# Patient Record
Sex: FEMALE | Race: WHITE | NOT HISPANIC OR LATINO | Employment: OTHER | URBAN - METROPOLITAN AREA
[De-identification: names, ages, dates, MRNs, and addresses within clinical notes are randomized per-mention and may not be internally consistent; named-entity substitution may affect disease eponyms.]

---

## 2017-01-02 ENCOUNTER — APPOINTMENT (INPATIENT)
Dept: RADIOLOGY | Facility: HOSPITAL | Age: 82
DRG: 064 | End: 2017-01-02
Payer: MEDICARE

## 2017-01-02 PROCEDURE — 71010 HB CHEST X-RAY 1 VIEW FRONTAL (PORTABLE): CPT

## 2017-01-08 ENCOUNTER — APPOINTMENT (INPATIENT)
Dept: RADIOLOGY | Facility: HOSPITAL | Age: 82
DRG: 064 | End: 2017-01-08
Payer: MEDICARE

## 2017-01-08 PROCEDURE — 70450 CT HEAD/BRAIN W/O DYE: CPT

## 2017-01-11 PROBLEM — R13.10 DYSPHAGIA: Status: ACTIVE | Noted: 2017-01-11

## 2017-01-11 PROBLEM — I48.91 ATRIAL FIBRILLATION (HCC): Status: ACTIVE | Noted: 2017-01-11

## 2017-01-11 PROBLEM — E87.0 HYPERNATREMIA: Status: ACTIVE | Noted: 2017-01-11

## 2017-01-11 PROBLEM — F03.90 DEMENTIA (HCC): Status: ACTIVE | Noted: 2017-01-11

## 2017-01-11 PROBLEM — E86.0 DEHYDRATION: Status: ACTIVE | Noted: 2017-01-11

## 2017-01-12 PROBLEM — R31.9 HEMATURIA: Status: ACTIVE | Noted: 2017-01-12

## 2017-01-12 PROBLEM — E87.0 HYPERNATREMIA: Status: RESOLVED | Noted: 2017-01-11 | Resolved: 2017-01-12

## 2017-02-17 ENCOUNTER — APPOINTMENT (OUTPATIENT)
Dept: AUDIOLOGY | Facility: CLINIC | Age: 82
End: 2017-02-17
Payer: COMMERCIAL

## 2017-02-17 PROCEDURE — V5299 HEARING SERVICE: HCPCS | Performed by: AUDIOLOGIST

## 2017-03-15 ENCOUNTER — APPOINTMENT (OUTPATIENT)
Dept: AUDIOLOGY | Facility: CLINIC | Age: 82
End: 2017-03-15
Payer: COMMERCIAL

## 2017-05-05 ENCOUNTER — HOSPITAL ENCOUNTER (EMERGENCY)
Facility: HOSPITAL | Age: 82
Discharge: HOME | End: 2017-05-05
Attending: EMERGENCY MEDICINE | Admitting: EMERGENCY MEDICINE
Payer: MEDICARE

## 2017-05-05 ENCOUNTER — APPOINTMENT (EMERGENCY)
Dept: RADIOLOGY | Facility: HOSPITAL | Age: 82
End: 2017-05-05
Payer: MEDICARE

## 2017-05-05 VITALS
HEART RATE: 75 BPM | RESPIRATION RATE: 16 BRPM | SYSTOLIC BLOOD PRESSURE: 155 MMHG | OXYGEN SATURATION: 94 % | TEMPERATURE: 98 F | DIASTOLIC BLOOD PRESSURE: 76 MMHG | WEIGHT: 80 LBS | BODY MASS INDEX: 14.63 KG/M2

## 2017-05-05 DIAGNOSIS — M25.551 RIGHT HIP PAIN: Primary | ICD-10-CM

## 2017-05-05 PROCEDURE — 73502 X-RAY EXAM HIP UNI 2-3 VIEWS: CPT

## 2017-05-05 PROCEDURE — 99284 EMERGENCY DEPT VISIT MOD MDM: CPT

## 2017-05-05 RX ORDER — HYDROCODONE BITARTRATE AND ACETAMINOPHEN 5; 325 MG/1; MG/1
1 TABLET ORAL 2 TIMES DAILY
COMMUNITY
End: 2018-02-07 | Stop reason: SDUPTHER

## 2017-05-05 RX ORDER — IPRATROPIUM BROMIDE AND ALBUTEROL SULFATE 2.5; .5 MG/3ML; MG/3ML
3 SOLUTION RESPIRATORY (INHALATION) 2 TIMES DAILY
COMMUNITY
End: 2019-01-19 | Stop reason: ALTCHOICE

## 2017-05-05 RX ORDER — DOCUSATE SODIUM 100 MG/1
200 CAPSULE, LIQUID FILLED ORAL DAILY
COMMUNITY

## 2017-05-22 ENCOUNTER — HOSPITAL ENCOUNTER (EMERGENCY)
Facility: HOSPITAL | Age: 82
Discharge: HOME | End: 2017-05-22
Admitting: EMERGENCY MEDICINE
Payer: MEDICARE

## 2017-05-22 ENCOUNTER — APPOINTMENT (EMERGENCY)
Dept: RADIOLOGY | Facility: HOSPITAL | Age: 82
End: 2017-05-22
Payer: MEDICARE

## 2017-05-22 VITALS
TEMPERATURE: 97.5 F | SYSTOLIC BLOOD PRESSURE: 143 MMHG | WEIGHT: 80.03 LBS | BODY MASS INDEX: 14.73 KG/M2 | HEIGHT: 62 IN | OXYGEN SATURATION: 94 % | HEART RATE: 82 BPM | DIASTOLIC BLOOD PRESSURE: 77 MMHG | RESPIRATION RATE: 18 BRPM

## 2017-05-22 DIAGNOSIS — M25.551 RIGHT HIP PAIN: ICD-10-CM
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
## 2017-05-22 LAB
ALBUMIN SERPL BCP-MCNC: 3.6 G/DL (ref 3.5–5)
ALP SERPL-CCNC: 73 U/L (ref 46–116)
ALT SERPL W P-5'-P-CCNC: 23 U/L (ref 12–78)
ANION GAP SERPL CALCULATED.3IONS-SCNC: 7 MMOL/L (ref 4–13)
AST SERPL W P-5'-P-CCNC: 19 U/L (ref 5–45)
BASOPHILS # BLD AUTO: 0 THOUSANDS/ΜL (ref 0–0.1)
BASOPHILS NFR BLD AUTO: 0 % (ref 0–1)
BILIRUB SERPL-MCNC: 0.4 MG/DL (ref 0.2–1)
BUN SERPL-MCNC: 11 MG/DL (ref 5–25)
CALCIUM SERPL-MCNC: 9.4 MG/DL (ref 8.3–10.1)
CHLORIDE SERPL-SCNC: 100 MMOL/L (ref 100–108)
CO2 SERPL-SCNC: 33 MMOL/L (ref 21–32)
CREAT SERPL-MCNC: 0.75 MG/DL (ref 0.6–1.3)
EOSINOPHIL # BLD AUTO: 0.1 THOUSAND/ΜL (ref 0–0.61)
EOSINOPHIL NFR BLD AUTO: 1 % (ref 0–6)
ERYTHROCYTE [DISTWIDTH] IN BLOOD BY AUTOMATED COUNT: 12.9 % (ref 11.6–15.1)
GFR SERPL CREATININE-BSD FRML MDRD: >60 ML/MIN/1.73SQ M
GLUCOSE SERPL-MCNC: 104 MG/DL (ref 65–140)
HCT VFR BLD AUTO: 47.6 % (ref 37–47)
HGB BLD-MCNC: 15.6 G/DL (ref 12–16)
LYMPHOCYTES # BLD AUTO: 2.4 THOUSANDS/ΜL (ref 0.6–4.47)
LYMPHOCYTES NFR BLD AUTO: 27 % (ref 14–44)
MCH RBC QN AUTO: 31.3 PG (ref 27–31)
MCHC RBC AUTO-ENTMCNC: 32.8 G/DL (ref 31.4–37.4)
MCV RBC AUTO: 95 FL (ref 82–98)
MONOCYTES # BLD AUTO: 0.5 THOUSAND/ΜL (ref 0.17–1.22)
MONOCYTES NFR BLD AUTO: 6 % (ref 4–12)
NEUTROPHILS # BLD AUTO: 5.8 THOUSANDS/ΜL (ref 1.85–7.62)
NEUTS SEG NFR BLD AUTO: 66 % (ref 43–75)
NRBC BLD AUTO-RTO: 0 /100 WBCS
PLATELET # BLD AUTO: 277 THOUSANDS/UL (ref 130–400)
PMV BLD AUTO: 7.7 FL (ref 8.9–12.7)
POTASSIUM SERPL-SCNC: 4.1 MMOL/L (ref 3.5–5.3)
PROT SERPL-MCNC: 7.4 G/DL (ref 6.4–8.2)
RBC # BLD AUTO: 4.99 MILLION/UL (ref 4.2–5.4)
SODIUM SERPL-SCNC: 140 MMOL/L (ref 136–145)
WBC # BLD AUTO: 8.8 THOUSAND/UL (ref 4.8–10.8)

## 2017-05-22 PROCEDURE — 73502 X-RAY EXAM HIP UNI 2-3 VIEWS: CPT

## 2017-05-22 PROCEDURE — 36415 COLL VENOUS BLD VENIPUNCTURE: CPT | Performed by: PHYSICIAN ASSISTANT

## 2017-05-22 PROCEDURE — 70450 CT HEAD/BRAIN W/O DYE: CPT

## 2017-05-22 PROCEDURE — 85025 COMPLETE CBC W/AUTO DIFF WBC: CPT | Performed by: PHYSICIAN ASSISTANT

## 2017-05-22 PROCEDURE — 99284 EMERGENCY DEPT VISIT MOD MDM: CPT

## 2017-05-22 PROCEDURE — 80053 COMPREHEN METABOLIC PANEL: CPT | Performed by: PHYSICIAN ASSISTANT

## 2017-05-22 RX ORDER — ACETAMINOPHEN 325 MG/1
650 TABLET ORAL ONCE
Status: DISCONTINUED | OUTPATIENT
Start: 2017-05-22 | End: 2017-05-22

## 2018-01-11 NOTE — RESULT NOTES
Verified Results  * MAMMO SCREENING BILATERAL W CAD [id number] 11:56AM Girish Khan Order Number: [id number]    - Patient Instructions: To schedule this appointment, please contact Central Scheduling at [phone]  Do not wear any perfume, powder, lotion or deodorant on breast or underarm area  Please bring your doctors order, referral (if needed) and insurance information with you on the day of the test  Failure to bring this information may result in this test being rescheduled  Arrive 15 minutes prior to your appointment time to register  On the day of your test, please bring any prior mammogram or breast studies with you that were not performed at a [facility]  Failure to bring prior exams may result in your test needing to be rescheduled   Order Number: [id number]    - Patient Instructions: To schedule this appointment, please contact Central Scheduling at [phone]  Do not wear any perfume, powder, lotion or deodorant on breast or underarm area  Please bring your doctors order, referral (if needed) and insurance information with you on the day of the test  Failure to bring this information may result in this test being rescheduled  Arrive 15 minutes prior to your appointment time to register  On the day of your test, please bring any prior mammogram or breast studies with you that were not performed at a [facility]  Failure to bring prior exams may result in your test needing to be rescheduled   Order Number: [id number]    - Patient Instructions: To schedule this appointment, please contact Central Scheduling at [phone]  Do not wear any perfume, powder, lotion or deodorant on breast or underarm area  Please bring your doctors order, referral (if needed) and insurance information with you on the day of the test  Failure to bring this information may result in this test being rescheduled   Arrive 15 minutes prior to your appointment time to register  On the day of your test, please bring any prior mammogram or breast studies with you that were not performed at a [facility]  Failure to bring prior exams may result in your test needing to be rescheduled  Test Name Result Flag Reference   MAMMO SCREENING BILATERAL W CAD (Report)     Patient History:   Patient is postmenopausal    Patient's BMI is 17 8  Reason for exam: screening (asymptomatic)  Screening     Mammo Screening Bilateral W CAD: November 23, 2016 - Check In #:    [de-identified]   Bilateral MLO, CC, and XCCL view(s) were taken  Technologist: SONDRA Luz   Prior study comparison: September 28, 2015, [facility] screening    bilateral digital phoebe, performed at [place] [address]  July 29, 2014, bilateral screening mammogram, performed    at [place] [address]  July 15, 2013, bilateral   screening mammogram, performed at [place] [address]  May 3, 2012, bilateral screening mammogram, performed at   [place] [address]  April 28, 2011, bilateral    screening mammogram, performed at [place] [address]  April 19, 2010, bilateral screening mammogram, performed   at [place] [address]  February 12, 2009,    screening mammogram, performed at [place] [address]  The breast tissue is extremely dense, potentially limiting the    sensitivity of mammography  Patient risk, included in this    report, assists in determining the appropriate screening regimen    (such as 3-D mammography or the inclusion of automated breast    ultrasound or MRI)  3-D mammography may also remain indicated as    screening  Bilateral digital mammography is performed  No    dominant soft tissue mass, architectural distortion or suspicious   calcifications are noted  The skin and nipple contours are    within normal limits   Scattered benign appearing calcifications    are noted  No evidence of malignancy  No significant changes when   compared to prior studies  1  No evidence of malignancy  2  No significant change when compared with the prior study  ASSESSMENT: BiRad:2 - Benign     Recommendation:   Routine screening mammogram of both breasts in 1 year  A reminder letter will be scheduled   Analyzed by CAD     8-10% of cancers will be missed on mammography  Management of a    palpable abnormality must be based on clinical grounds  Patients   will be notified of their results via letter from our facility  Accredited by [employer] of [employer] and FDA       Transcription Location: [facility] 98: [id number]     Risk Value(s):   Tyrer-Cuzick 10 Year: 0 574%, Tyrer-Cuzick Lifetime: 0 574%,    Myriad Table: 1 5%, CHANO 5 Year: 1 3%, NCI Lifetime: 1 7%   Signed by:   Verna Hutchinson MD   11/23/16       Discussion/Summary   Your mammogram is normal    Coit Raymond

## 2018-01-16 NOTE — RESULT NOTES
Verified Results  (1) COMPREHENSIVE METABOLIC PANEL [id number] 48:51ZI Jonas Duarte   National Kidney Disease Education Program recommendations are as follows:  GFR calculation is accurate only with a steady state creatinine  Chronic Kidney disease less than 60 ml/min/1 73 sq  meters  Kidney failure less than 15 ml/min/1 73 sq  meters  Test Name Result Flag Reference   GLUCOSE,RANDM 102 mg/dL     SODIUM 140 mmol/L  136-145   POTASSIUM 4 4 mmol/L  3 5-5 3   CHLORIDE 103 mmol/L  100-108   CARBON DIOXIDE 29 mmol/L  21-32   ANION GAP (CALC) 8 mmol/L  4-13   BLOOD UREA NITROGEN 24 mg/dL  5-25   CREATININE 0 76 mg/dL  0 60-1 30   Standardized to IDMS reference method   CALCIUM 9 3 mg/dL  8 3-10 1   BILI, TOTAL 0 51 mg/dL  0 20-1 00   ALK PHOSPHATAS 78 U/L     ALT (SGPT) 49 U/L  12-78   AST(SGOT) 36 U/L  5-45   ALBUMIN 4 0 g/dL  3 5-5 0   TOTAL PROTEIN 7 5 g/dL  6 4-8 2   eGFR Non-African American      >60 0 ml/min/1 73sq m     (1) LIPID PANEL, FASTING [id number] 12:20PM Jonas Duarte   Triglyceride:         Normal              <150 mg/dl       Borderline High    150-199 mg/dl       High               200-499 mg/dl       Very High          >499 mg/dl  Cholesterol:         Desirable        <200 mg/dl      Borderline High  200-239 mg/dl      High             >239 mg/dl  HDL Cholesterol:        High    >59 mg/dL      Low     <41 mg/dL  LDL CALCULATED:    This screening LDL is a calculated result  It does not have the accuracy of the Direct Measured LDL in the monitoring of patients with hyperlipidemia and/or statin therapy  Direct Measure LDL (XZK467) must be ordered separately in these patients       Test Name Result Flag Reference   CHOLESTEROL 166 mg/dL     HDL,DIRECT 79 mg/dL H 40-60   LDL CHOLESTEROL CALCULATED 70 mg/dL  0-100   TRIGLYCERIDES 84 mg/dL  <=150       Discussion/Summary   all labs are normal - Dr LAKHANI

## 2018-01-17 NOTE — PROGRESS NOTES
Assessment    1  Diarrhea (787 91) (R19 7)   2  Primary localized osteoarthrosis of the hip, unspecified laterality (715 15) (M16 10)   3  Vulvovaginitis candida albicans (112 1) (B37 3)   4  Chronic obstructive pulmonary disease (496) (J44 9)    Plan  Chronic obstructive pulmonary disease    · Anoro Ellipta 62 5-25 MCG/INH Inhalation Aerosol Powder Breath Activated;  INHALE 1 PUFF DAILY  Diarrhea    · Gabapentin 100 MG Oral Capsule; TAKE 1 CAPSULE 3 TIMES DAILY  Primary localized osteoarthrosis of the hip, unspecified laterality    · Hydrocodone-Acetaminophen  MG Oral Tablet; TAKE 1 TABLET EVERY 6  HOURS AS NEEDED FOR PAIN  Vulvovaginitis candida albicans    · Fluconazole 150 MG Oral Tablet; 1 tab po once a week x 8  Unlinked    · Spiriva HandiHaler 18 MCG Inhalation Capsule    Discussion/Summary    Rto in 1 m   Boost or Ensure 2 times a day  Possible side effects of new medications were reviewed with the patient/guardian today  The treatment plan was reviewed with the patient/guardian  The patient/guardian understands and agrees with the treatment plan      Chief Complaint  Pt c/o diarrhea, weakness, and tiredness, increase in yeast inf; referral to see Rheumatologist randa fowler      History of Present Illness  HPI: 80 y o f with multiple issues seen with her daughter - 1  frequent vaginal irritation/yeast infections over last few months, oral med helped in the past, 2 episode of constipation and diarrhea - on pain meds, taking laxatives PRN but has diarrhea for a few days after, eating less but still has a good appetite, 3 multiple joints pain - Vicodin helps only some  4 has COPD - stopped smoking many years ago - SOB with minimal exertion, on Spiriva with minimal help, not on home O2      Review of Systems    Constitutional: recent weight loss, but no fever, not feeling poorly and no recent weight gain     Cardiovascular: no complaints of slow or fast heart rate, no chest pain, no palpitations, no leg claudication or lower extremity edema  Respiratory: shortness of breath during exertion, but as noted in HPI, no shortness of breath, no cough, no orthopnea and no wheezing  Gastrointestinal: constipation and diarrhea, but as noted in HPI, no abdominal pain and no nausea  Musculoskeletal: arthralgias and myalgias, but as noted in HPI, no joint swelling and no joint stiffness  Integumentary: no complaints of skin rash or lesion, no itching or dry skin, no skin wounds  Neurological: no complaints of headache, no confusion, no numbness or tingling, no dizziness or fainting  Active Problems    1  History of Aftercare following joint replacement surgery (V54 81) (Z47 1)   2  Arthritis (716 90) (M19 90)   3  Chronic obstructive pulmonary disease (496) (J44 9)   4  Depression (311) (F32 9)   5  Encounter for screening mammogram for malignant neoplasm of breast (V76 12)   (Z12 31)   6  Hypercholesterolemia (272 0) (E78 0)   7  Hypertension (401 9) (I10)   8  Overactive bladder (596 51) (N32 81)   9  Primary localized osteoarthrosis of the hip, unspecified laterality (715 15) (M16 10)    Past Medical History    1  History of Aftercare following joint replacement surgery (V54 81) (Z47 1)   2  History of Epigastric pain (789 06) (R10 13)   3  History of insect bite (V15 59) (Z87 828)   4  History of Stroke Syndrome (436)    Family History    1  Family history of Coronary arteriosclerosis    2  Family history of Arthritis (V17 7)   3  Family history of Cancer  Family History Reviewed: The family history was reviewed and updated today  Social History    · Daily Coffee Consumption (2  Cups/Day)   · Never A Smoker  The social history was reviewed and updated today  Surgical History    1  History of Cholecystectomy   2  History of Elbow Surgery   3  History of Hemorrhoidectomy   4  History of Hip Surgery   5  History of Hysterectomy  Surgical History Reviewed:    The surgical history was reviewed and updated today  Current Meds   1  Allopurinol 100 MG Oral Tablet; Therapy: (Recorded:45Isi3922) to Recorded   2  Aricept 10 MG Oral Tablet; Therapy: (Recorded:16Vkl1862) to Recorded   3  Aspir-81 TBEC; Therapy: (Recorded:26Jkd8222) to Recorded   4  Atenolol 100 MG Oral Tablet; 1 every day; Therapy: (Pauly Harding) to  Requested for: 31Dgz1156 Recorded   5  Crestor 10 MG Oral Tablet; 1 EVERY MORNING  Requested for: 80RVJ3348; Last   Rx:49Hyq7778 Ordered   6  Hydrocodone-Acetaminophen  MG Oral Tablet; TAKE 1 TABLET EVERY 6   HOURS AS NEEDED FOR PAIN; Last Rx:17Uxm8190 Ordered   7  Mupirocin Calcium 2 % External Cream; APPLY THIN LAYER TO AFFECTED AREA(S) 3   TIMES DAILY; Therapy: 72Yth5632 to (Last Rx:76Hmx6609)  Requested for: 63Bkb8578 Ordered   8  PARoxetine HCl - 20 MG Oral Tablet; take 1 tablet by mouth once daily; Therapy: 33CEZ3868 to ([id number])  Requested for: 35DIA8593; Last   Rx:45Ntv0931 Ordered   9  Spiriva HandiHaler 18 MCG Inhalation Capsule; Therapy: (Recorded:60Uen9894) to Recorded   10  Voltaren 1 % Transdermal Gel; Therapy: (Recorded:30Jto4434) to Recorded    Allergies    1  No Known Drug Allergies    Vitals   Recorded: 95CWO4763 11:34AM   Temperature 97 4 F   Heart Rate 74   Respiration 16   Systolic 256   Diastolic 70   Height 5 ft    Weight 94 lb    BMI Calculated 18 36   BSA Calculated 1 35     Physical Exam    Constitutional   General appearance: Abnormal   chronically ill, comfortable, underweight and appears older than stated age  Pulmonary   Respiratory effort: Abnormal   Assessment of respiratory effort revealed accessory muscle use, but no air hunger and no distress  Respiratory Findings: no dry cough and no wet cough  Auscultation of lungs: Abnormal   Auscultation of the lungs revealed decreased breath sounds diffusely  no rales or crackles were heard bilaterally  no rhonchi  no wheezing     Cardiovascular   Auscultation of heart: Normal rate and rhythm, normal S1 and S2, without murmurs  Examination of extremities for edema and/or varicosities: Normal     Abdomen   Abdomen: Non-tender, no masses  Musculoskeletal   Gait and station: Normal     Inspection/palpation of joints, bones, and muscles: Normal          Future Appointments    Date/Time Provider Specialty Site   02/22/2016 02:00 PM May GONSALO Hall   Family Medicine [address]     Signatures   Electronically signed by : GONSALO Daivs ; Jan 14 2016  6:38PM EST                       (Author)

## 2018-02-07 DIAGNOSIS — R52 PAIN: Primary | ICD-10-CM

## 2018-02-07 RX ORDER — HYDROCODONE BITARTRATE AND ACETAMINOPHEN 5; 325 MG/1; MG/1
TABLET ORAL
Qty: 60 TABLET | Refills: 0 | Status: SHIPPED | OUTPATIENT
Start: 2018-02-07 | End: 2018-03-08 | Stop reason: SDUPTHER

## 2018-03-08 DIAGNOSIS — R52 PAIN: ICD-10-CM

## 2018-03-09 DIAGNOSIS — R52 PAIN: ICD-10-CM

## 2018-03-09 RX ORDER — HYDROCODONE BITARTRATE AND ACETAMINOPHEN 5; 325 MG/1; MG/1
1 TABLET ORAL 2 TIMES DAILY
Qty: 60 TABLET | Refills: 0 | Status: SHIPPED | OUTPATIENT
Start: 2018-03-09 | End: 2018-04-09 | Stop reason: SDUPTHER

## 2018-03-09 RX ORDER — HYDROCODONE BITARTRATE AND ACETAMINOPHEN 5; 325 MG/1; MG/1
TABLET ORAL
Qty: 60 TABLET | Refills: 0 | Status: SHIPPED | OUTPATIENT
Start: 2018-03-09 | End: 2018-03-09 | Stop reason: SDUPTHER

## 2018-04-09 DIAGNOSIS — R52 PAIN: ICD-10-CM

## 2018-04-09 RX ORDER — HYDROCODONE BITARTRATE AND ACETAMINOPHEN 5; 325 MG/1; MG/1
TABLET ORAL
Qty: 60 TABLET | Refills: 0 | Status: SHIPPED | OUTPATIENT
Start: 2018-04-09 | End: 2018-04-09 | Stop reason: SDUPTHER

## 2018-04-09 RX ORDER — HYDROCODONE BITARTRATE AND ACETAMINOPHEN 5; 325 MG/1; MG/1
TABLET ORAL
Qty: 60 TABLET | Refills: 0 | Status: SHIPPED | OUTPATIENT
Start: 2018-04-09 | End: 2018-05-01 | Stop reason: SDUPTHER

## 2018-04-10 DIAGNOSIS — R52 PAIN: ICD-10-CM

## 2018-04-10 RX ORDER — HYDROCODONE BITARTRATE AND ACETAMINOPHEN 5; 325 MG/1; MG/1
TABLET ORAL
Qty: 60 TABLET | Refills: 0 | OUTPATIENT
Start: 2018-04-10

## 2018-05-01 DIAGNOSIS — R52 PAIN: ICD-10-CM

## 2018-05-01 RX ORDER — HYDROCODONE BITARTRATE AND ACETAMINOPHEN 5; 325 MG/1; MG/1
TABLET ORAL
Qty: 60 TABLET | Refills: 0 | Status: SHIPPED | OUTPATIENT
Start: 2018-05-01 | End: 2018-05-02 | Stop reason: SDUPTHER

## 2018-05-02 DIAGNOSIS — R52 PAIN: ICD-10-CM

## 2018-05-02 RX ORDER — HYDROCODONE BITARTRATE AND ACETAMINOPHEN 5; 325 MG/1; MG/1
TABLET ORAL
Qty: 60 TABLET | Refills: 0 | Status: SHIPPED | OUTPATIENT
Start: 2018-05-02 | End: 2018-06-08 | Stop reason: SDUPTHER

## 2018-05-09 ENCOUNTER — TELEPHONE (OUTPATIENT)
Dept: FAMILY MEDICINE CLINIC | Facility: CLINIC | Age: 83
End: 2018-05-09

## 2018-05-09 NOTE — TELEPHONE ENCOUNTER
Dr Adelaida Austin    Patient's daughter wants to know if you were able to check on sore by patient's nose last time you were at [facility]  Says she discussed this with you recently

## 2018-06-08 DIAGNOSIS — R52 PAIN: ICD-10-CM

## 2018-06-08 RX ORDER — HYDROCODONE BITARTRATE AND ACETAMINOPHEN 5; 325 MG/1; MG/1
TABLET ORAL
Qty: 60 TABLET | Refills: 0 | Status: SHIPPED | OUTPATIENT
Start: 2018-06-08 | End: 2018-07-09 | Stop reason: SDUPTHER

## 2018-07-09 DIAGNOSIS — R52 PAIN: ICD-10-CM

## 2018-07-09 RX ORDER — HYDROCODONE BITARTRATE AND ACETAMINOPHEN 5; 325 MG/1; MG/1
TABLET ORAL
Qty: 60 TABLET | Refills: 0 | Status: SHIPPED | OUTPATIENT
Start: 2018-07-09 | End: 2018-08-10 | Stop reason: SDUPTHER

## 2018-08-10 DIAGNOSIS — R52 PAIN: ICD-10-CM

## 2018-08-10 RX ORDER — HYDROCODONE BITARTRATE AND ACETAMINOPHEN 5; 325 MG/1; MG/1
TABLET ORAL
Qty: 60 TABLET | Refills: 0 | Status: SHIPPED | OUTPATIENT
Start: 2018-08-10 | End: 2018-08-28 | Stop reason: SDUPTHER

## 2018-08-28 DIAGNOSIS — R52 PAIN: ICD-10-CM

## 2018-08-28 RX ORDER — HYDROCODONE BITARTRATE AND ACETAMINOPHEN 5; 325 MG/1; MG/1
TABLET ORAL
Qty: 60 TABLET | Refills: 0 | Status: SHIPPED | OUTPATIENT
Start: 2018-08-28 | End: 2018-10-08 | Stop reason: SDUPTHER

## 2018-10-08 DIAGNOSIS — R52 PAIN: ICD-10-CM

## 2018-10-08 RX ORDER — HYDROCODONE BITARTRATE AND ACETAMINOPHEN 5; 325 MG/1; MG/1
TABLET ORAL
Qty: 60 TABLET | Refills: 0 | Status: SHIPPED | OUTPATIENT
Start: 2018-10-08 | End: 2018-11-07 | Stop reason: SDUPTHER

## 2018-11-07 DIAGNOSIS — R52 PAIN: ICD-10-CM

## 2018-11-07 RX ORDER — HYDROCODONE BITARTRATE AND ACETAMINOPHEN 5; 325 MG/1; MG/1
TABLET ORAL
Qty: 60 TABLET | Refills: 0 | Status: SHIPPED | OUTPATIENT
Start: 2018-11-07 | End: 2018-12-11 | Stop reason: SDUPTHER

## 2018-12-11 DIAGNOSIS — R52 PAIN: ICD-10-CM

## 2018-12-11 RX ORDER — HYDROCODONE BITARTRATE AND ACETAMINOPHEN 5; 325 MG/1; MG/1
TABLET ORAL
Qty: 60 TABLET | Refills: 0 | Status: SHIPPED | OUTPATIENT
Start: 2018-12-11 | End: 2019-01-19 | Stop reason: SDUPTHER

## 2018-12-11 RX ORDER — HYDROCODONE BITARTRATE AND ACETAMINOPHEN 5; 325 MG/1; MG/1
TABLET ORAL
Qty: 60 TABLET | Refills: 0 | Status: SHIPPED | OUTPATIENT
Start: 2018-12-11 | End: 2019-01-08 | Stop reason: SDUPTHER

## 2019-01-08 DIAGNOSIS — R52 PAIN: ICD-10-CM

## 2019-01-08 RX ORDER — HYDROCODONE BITARTRATE AND ACETAMINOPHEN 5; 325 MG/1; MG/1
TABLET ORAL
Qty: 60 TABLET | Refills: 0 | Status: SHIPPED | OUTPATIENT
Start: 2019-01-08 | End: 2019-01-23 | Stop reason: SDUPTHER

## 2019-01-13 ENCOUNTER — APPOINTMENT (EMERGENCY)
Dept: RADIOLOGY | Facility: HOSPITAL | Age: 84
End: 2019-01-13
Payer: MEDICARE

## 2019-01-13 ENCOUNTER — APPOINTMENT (EMERGENCY)
Dept: RADIOLOGY | Facility: HOSPITAL | Age: 84
End: 2019-01-13
Attending: EMERGENCY MEDICINE
Payer: MEDICARE

## 2019-01-13 ENCOUNTER — HOSPITAL ENCOUNTER (EMERGENCY)
Facility: HOSPITAL | Age: 84
Discharge: NURSING, REHAB OR LONG-TERM CARE | End: 2019-01-13
Attending: EMERGENCY MEDICINE | Admitting: EMERGENCY MEDICINE
Payer: MEDICARE

## 2019-01-13 VITALS
TEMPERATURE: 96.1 F | RESPIRATION RATE: 20 BRPM | DIASTOLIC BLOOD PRESSURE: 92 MMHG | SYSTOLIC BLOOD PRESSURE: 163 MMHG | WEIGHT: 100 LBS | BODY MASS INDEX: 18.29 KG/M2 | HEART RATE: 84 BPM | OXYGEN SATURATION: 98 %

## 2019-01-13 DIAGNOSIS — N39.0 UTI (URINARY TRACT INFECTION): Primary | ICD-10-CM

## 2019-01-13 LAB
ALBUMIN SERPL BCP-MCNC: 3.2 G/DL (ref 3.5–5)
ALP SERPL-CCNC: 77 U/L (ref 46–116)
ALT SERPL W P-5'-P-CCNC: 20 U/L (ref 12–78)
ANION GAP SERPL CALCULATED.3IONS-SCNC: 9 MMOL/L (ref 4–13)
AST SERPL W P-5'-P-CCNC: 21 U/L (ref 5–45)
BACTERIA UR QL AUTO: ABNORMAL /HPF
BASOPHILS # BLD AUTO: 0.08 THOUSANDS/ΜL (ref 0–0.1)
BASOPHILS NFR BLD AUTO: 1 % (ref 0–1)
BILIRUB SERPL-MCNC: 0.3 MG/DL (ref 0.2–1)
BILIRUB UR QL STRIP: NEGATIVE
BUN SERPL-MCNC: 19 MG/DL (ref 5–25)
CALCIUM SERPL-MCNC: 9.1 MG/DL (ref 8.3–10.1)
CHLORIDE SERPL-SCNC: 102 MMOL/L (ref 100–108)
CK SERPL-CCNC: 45 U/L (ref 26–192)
CLARITY UR: ABNORMAL
CO2 SERPL-SCNC: 30 MMOL/L (ref 21–32)
COLOR UR: YELLOW
CREAT SERPL-MCNC: 0.84 MG/DL (ref 0.6–1.3)
EOSINOPHIL # BLD AUTO: 0.16 THOUSAND/ΜL (ref 0–0.61)
EOSINOPHIL NFR BLD AUTO: 1 % (ref 0–6)
ERYTHROCYTE [DISTWIDTH] IN BLOOD BY AUTOMATED COUNT: 11.6 % (ref 11.6–15.1)
GFR SERPL CREATININE-BSD FRML MDRD: 64 ML/MIN/1.73SQ M
GLUCOSE SERPL-MCNC: 141 MG/DL (ref 65–140)
GLUCOSE UR STRIP-MCNC: NEGATIVE MG/DL
HCT VFR BLD AUTO: 45.2 % (ref 34.8–46.1)
HGB BLD-MCNC: 14.2 G/DL (ref 11.5–15.4)
HGB UR QL STRIP.AUTO: ABNORMAL
IMM GRANULOCYTES # BLD AUTO: 0.04 THOUSAND/UL (ref 0–0.2)
IMM GRANULOCYTES NFR BLD AUTO: 0 % (ref 0–2)
KETONES UR STRIP-MCNC: ABNORMAL MG/DL
LEUKOCYTE ESTERASE UR QL STRIP: ABNORMAL
LYMPHOCYTES # BLD AUTO: 1.93 THOUSANDS/ΜL (ref 0.6–4.47)
LYMPHOCYTES NFR BLD AUTO: 15 % (ref 14–44)
MCH RBC QN AUTO: 31.2 PG (ref 26.8–34.3)
MCHC RBC AUTO-ENTMCNC: 31.4 G/DL (ref 31.4–37.4)
MCV RBC AUTO: 99 FL (ref 82–98)
MONOCYTES # BLD AUTO: 0.81 THOUSAND/ΜL (ref 0.17–1.22)
MONOCYTES NFR BLD AUTO: 6 % (ref 4–12)
NEUTROPHILS # BLD AUTO: 10.11 THOUSANDS/ΜL (ref 1.85–7.62)
NEUTS SEG NFR BLD AUTO: 77 % (ref 43–75)
NITRITE UR QL STRIP: NEGATIVE
NON-SQ EPI CELLS URNS QL MICRO: ABNORMAL /HPF
NRBC BLD AUTO-RTO: 0 /100 WBCS
NT-PROBNP SERPL-MCNC: 650 PG/ML
PH UR STRIP.AUTO: 6 [PH] (ref 5–9)
PLATELET # BLD AUTO: 268 THOUSANDS/UL (ref 149–390)
PMV BLD AUTO: 9.6 FL (ref 8.9–12.7)
POTASSIUM SERPL-SCNC: 3.5 MMOL/L (ref 3.5–5.3)
PROT SERPL-MCNC: 7.1 G/DL (ref 6.4–8.2)
PROT UR STRIP-MCNC: ABNORMAL MG/DL
RBC # BLD AUTO: 4.55 MILLION/UL (ref 3.81–5.12)
RBC #/AREA URNS AUTO: ABNORMAL /HPF
SODIUM SERPL-SCNC: 141 MMOL/L (ref 136–145)
SP GR UR STRIP.AUTO: 1.02 (ref 1–1.03)
TROPONIN I SERPL-MCNC: <0.02 NG/ML
UROBILINOGEN UR QL STRIP.AUTO: 1 E.U./DL
WBC # BLD AUTO: 13.13 THOUSAND/UL (ref 4.31–10.16)
WBC #/AREA URNS AUTO: ABNORMAL /HPF

## 2019-01-13 PROCEDURE — 84484 ASSAY OF TROPONIN QUANT: CPT | Performed by: EMERGENCY MEDICINE

## 2019-01-13 PROCEDURE — 80053 COMPREHEN METABOLIC PANEL: CPT | Performed by: EMERGENCY MEDICINE

## 2019-01-13 PROCEDURE — 96365 THER/PROPH/DIAG IV INF INIT: CPT

## 2019-01-13 PROCEDURE — 83880 ASSAY OF NATRIURETIC PEPTIDE: CPT | Performed by: EMERGENCY MEDICINE

## 2019-01-13 PROCEDURE — 73502 X-RAY EXAM HIP UNI 2-3 VIEWS: CPT

## 2019-01-13 PROCEDURE — 85025 COMPLETE CBC W/AUTO DIFF WBC: CPT | Performed by: EMERGENCY MEDICINE

## 2019-01-13 PROCEDURE — 99285 EMERGENCY DEPT VISIT HI MDM: CPT

## 2019-01-13 PROCEDURE — 82550 ASSAY OF CK (CPK): CPT | Performed by: EMERGENCY MEDICINE

## 2019-01-13 PROCEDURE — 81001 URINALYSIS AUTO W/SCOPE: CPT | Performed by: EMERGENCY MEDICINE

## 2019-01-13 PROCEDURE — 36415 COLL VENOUS BLD VENIPUNCTURE: CPT | Performed by: EMERGENCY MEDICINE

## 2019-01-13 PROCEDURE — 71045 X-RAY EXAM CHEST 1 VIEW: CPT

## 2019-01-13 PROCEDURE — 72125 CT NECK SPINE W/O DYE: CPT

## 2019-01-13 PROCEDURE — 70450 CT HEAD/BRAIN W/O DYE: CPT

## 2019-01-13 RX ORDER — CEPHALEXIN 250 MG/1
500 CAPSULE ORAL EVERY 12 HOURS SCHEDULED
Qty: 20 CAPSULE | Refills: 0 | Status: SHIPPED | OUTPATIENT
Start: 2019-01-13 | End: 2019-01-13 | Stop reason: ALTCHOICE

## 2019-01-13 RX ORDER — CEFTRIAXONE 1 G/50ML
1000 INJECTION, SOLUTION INTRAVENOUS ONCE
Status: COMPLETED | OUTPATIENT
Start: 2019-01-13 | End: 2019-01-13

## 2019-01-13 RX ORDER — NITROFURANTOIN 25; 75 MG/1; MG/1
100 CAPSULE ORAL 2 TIMES DAILY
Qty: 10 CAPSULE | Refills: 0 | Status: SHIPPED | OUTPATIENT
Start: 2019-01-13 | End: 2019-01-22 | Stop reason: HOSPADM

## 2019-01-13 RX ADMIN — CEFTRIAXONE 1000 MG: 1 INJECTION, SOLUTION INTRAVENOUS at 02:43

## 2019-01-13 NOTE — DISCHARGE INSTRUCTIONS

## 2019-01-13 NOTE — ED NOTES
Patient advised will be leaving in the morning to return to [facility], and repositioned on [address], RN  01/13/19 8334

## 2019-01-13 NOTE — ED PROVIDER NOTES
History  Chief Complaint   Patient presents with    Fall     patient found on ground at shift change, patient was agitated and confused has a history of dementia     Pt in ER from NH after an unwitnessed fall, she was found at her bedside at shift change  Pt has a hx of dementia and frequent falls  She is a poor historian  Prior to Admission Medications   Prescriptions Last Dose Informant Patient Reported? Taking?    HYDROcodone-acetaminophen (NORCO) 5-325 mg per tablet   No No   Si TABLET BY MOUTH TWICE DAILY   HYDROcodone-acetaminophen (NORCO) 5-325 mg per tablet   No No   Si tab PO 2 times a day for pain   acetaminophen (TYLENOL) 325 mg tablet   No No   Sig: Take 2 tablets by mouth every 4 (four) hours as needed for mild pain, headaches or fever   Patient taking differently: Take 325 mg by mouth daily At 1400    albuterol (PROVENTIL HFA,VENTOLIN HFA) 90 mcg/act inhaler   Yes No   Sig: Inhale 2 puffs every 6 (six) hours as needed for wheezing   artificial tear (LUBRIFRESH P M ) 83-15 % ophthalmic ointment   No No   Sig: Administer 1 application to both eyes daily at bedtime for 30 days   aspirin 81 MG tablet   Yes No   Sig: Take 81 mg by mouth daily   bisacodyl (DULCOLAX) 10 mg suppository   No No   Sig: Insert 1 suppository into the rectum daily as needed for constipation for up to 30 days   diltiazem (CARDIZEM SR) 60 mg 12 hr capsule   No No   Sig: Take 1 capsule by mouth every 12 (twelve) hours for 30 days   docusate sodium (COLACE) 100 mg capsule   Yes No   Sig: Take 200 mg by mouth daily   donepezil (ARICEPT) 10 mg tablet   Yes No   Sig: Take 10 mg by mouth daily at bedtime   ipratropium-albuterol (DUONEB) 0 5-2 5 mg/mL   Yes No   Sig: Take 3 mL by nebulization 2 (two) times a day   metoprolol tartrate 75 MG TABS   No No   Sig: Take 1 tablet by mouth every 12 (twelve) hours for 30 days   Patient taking differently: Take 75 mg by mouth 2 (two) times a day With meals    rosuvastatin (CRESTOR) 10 MG tablet   Yes No   Sig: Take 10 mg by mouth daily      Facility-Administered Medications: None       Past Medical History:   Diagnosis Date    Arthritis     [facility] [facility]     Dementia     [facility]     Gout     Gout     Hyperlipidemia     Hypertension        Past Surgical History:   Procedure Laterality Date    CATARACT EXTRACTION W/  INTRAOCULAR LENS IMPLANT Bilateral     CHOLECYSTECTOMY      ELBOW FRACTURE SURGERY      HYSTERECTOMY      JOINT REPLACEMENT Bilateral     Had bilateral hip surgery  One was repaired secondary to fracture other was replaced due to DJD       Family History   Problem Relation Age of Onset    Heart attack Father 45     I have reviewed and agree with the history as documented  Social History   Substance Use Topics    Smoking status: Former Smoker     Packs/day: 1 00     Years: 25 00     Types: Cigarettes     Quit date: 1976    Smokeless tobacco: Never Used      Comment: Patient former smoker, quit 1976    Alcohol use No        Review of Systems   Unable to perform ROS: Dementia       Physical Exam  Physical Exam   Constitutional: She appears well-developed and well-nourished  HENT:   Head: Normocephalic and atraumatic  Cardiovascular: Normal rate and regular rhythm  Pulmonary/Chest: Effort normal and breath sounds normal  No respiratory distress  Musculoskeletal: She exhibits tenderness  She exhibits no edema or deformity  Right hip: She exhibits tenderness and bony tenderness  She exhibits normal range of motion, normal strength, no swelling, no crepitus, no deformity and no laceration  Left hip: Normal         Right knee: Normal         Left knee: Normal         Right ankle: Normal         Left ankle: Normal    RUE flaccid   Neurological: She is alert  No cranial nerve deficit  Coordination normal    Nursing note and vitals reviewed        Vital Signs  ED Triage Vitals [01/13/19 0113]   Temp Pulse Respirations Blood Pressure SpO2   -- 69 18 148/82 94 %      Temp src Heart Rate Source Patient Position - Orthostatic VS BP Location FiO2 (%)   -- Monitor Lying Right arm --      Pain Score       --           Vitals:    01/13/19 0113   BP: 148/82   Pulse: 69   Patient Position - Orthostatic VS: Lying       Visual Acuity      ED Medications  Medications   cefTRIAXone (ROCEPHIN) IVPB (premix) 1,000 mg (1,000 mg Intravenous New Bag 1/13/19 0243)       Diagnostic Studies  Results Reviewed     Procedure Component Value Units Date/Time    Urine culture [[id number]]     Lab Status:  No result Specimen:  Urine     Urine Microscopic [[id number]]  (Abnormal) Collected:  01/13/19 0222    Lab Status:  Final result Specimen:  Urine from Urine, Straight Cath Updated:  01/13/19 0234     RBC, UA Innumerable (A) /hpf      WBC, UA Innumerable (A) /hpf      Epithelial Cells Occasional /hpf      Bacteria, UA Moderate (A) /hpf     UA w Reflex to Microscopic [[id number]]  (Abnormal) Collected:  01/13/19 0222    Lab Status:  Final result Specimen:  Urine from Urine, Straight Cath Updated:  01/13/19 0227     Color, UA Yellow     Clarity, UA Cloudy     Specific Gravity, UA 1 025     pH, UA 6 0     Leukocytes, UA Moderate (A)     Nitrite, UA Negative     Protein, UA Trace (A) mg/dl      Glucose, UA Negative mg/dl      Ketones, UA Trace (A) mg/dl      Urobilinogen, UA 1 0 E U /dl      Bilirubin, UA Negative     Blood, UA Large (A)    CK Total with Reflex CKMB [[id number]]  (Normal) Collected:  01/13/19 0124    Lab Status:  Final result Specimen:  Blood from Arm, Left Updated:  01/13/19 0151     Total CK 45 U/L     B-type natriuretic peptide [[id number]]  (Abnormal) Collected:  01/13/19 0124    Lab Status:  Final result Specimen:  Blood from Arm, Left Updated:  01/13/19 0151     NT-proBNP 650 (H) pg/mL     Troponin I [[id number]]  (Normal) Collected:  01/13/19 0124    Lab Status:  Final result Specimen:  Blood from Arm, Left Updated: 01/13/19 0146     Troponin I <0 02 ng/mL     Comprehensive metabolic panel [[id number]]  (Abnormal) Collected:  01/13/19 0124    Lab Status:  Final result Specimen:  Blood from Arm, Left Updated:  01/13/19 0145     Sodium 141 mmol/L      Potassium 3 5 mmol/L      Chloride 102 mmol/L      CO2 30 mmol/L      ANION GAP 9 mmol/L      BUN 19 mg/dL      Creatinine 0 84 mg/dL      Glucose 141 (H) mg/dL      Calcium 9 1 mg/dL      AST 21 U/L      ALT 20 U/L      Alkaline Phosphatase 77 U/L      Total Protein 7 1 g/dL      Albumin 3 2 (L) g/dL      Total Bilirubin 0 30 mg/dL      eGFR 64 ml/min/1 73sq m     Narrative:         National Kidney Disease Education Program recommendations are as follows:  GFR calculation is accurate only with a steady state creatinine  Chronic Kidney disease less than 60 ml/min/1 73 sq  meters  Kidney failure less than 15 ml/min/1 73 sq  meters  CBC and differential [[id number]]  (Abnormal) Collected:  01/13/19 0124    Lab Status:  Final result Specimen:  Blood from Arm, Left Updated:  01/13/19 0130     WBC 13 13 (H) Thousand/uL      RBC 4 55 Million/uL      Hemoglobin 14 2 g/dL      Hematocrit 45 2 %      MCV 99 (H) fL      MCH 31 2 pg      MCHC 31 4 g/dL      RDW 11 6 %      MPV 9 6 fL      Platelets 239 Thousands/uL      nRBC 0 /100 WBCs      Neutrophils Relative 77 (H) %      Immat GRANS % 0 %      Lymphocytes Relative 15 %      Monocytes Relative 6 %      Eosinophils Relative 1 %      Basophils Relative 1 %      Neutrophils Absolute 10 11 (H) Thousands/µL      Immature Grans Absolute 0 04 Thousand/uL      Lymphocytes Absolute 1 93 Thousands/µL      Monocytes Absolute 0 81 Thousand/µL      Eosinophils Absolute 0 16 Thousand/µL      Basophils Absolute 0 08 Thousands/µL                  XR hip/pelv 2-3 vws right   ED Interpretation by Elizabet Ram DO (01/13 0330)   nad      CT head without contrast   Final Result by Reji Gardner MD (01/13 0122)      1    No intracranial hemorrhage or calvarial fracture  2   Chronic small vessel ischemic changes  3   Redemonstrated chronic left middle cerebral artery territory infarction  Workstation performed: [id number]         CT cervical spine without contrast   Final Result by Jose Gomez MD (01/13 6168)      No cervical spine fracture or traumatic malalignment  Workstation performed: [id number]         XR chest 1 view portable    (Results Pending)              Procedures  Procedures       Phone Contacts  ED Phone Contact    ED Course                               MDM  Number of Diagnoses or Management Options  UTI (urinary tract infection):   Diagnosis management comments: Pt with a UTI, started on Rocephin  Previous cultures reviewed and resistant to Cephazolin  Will d/c to NH with macrobid  Amount and/or Complexity of Data Reviewed  Clinical lab tests: ordered and reviewed  Tests in the radiology section of CPT®: ordered and reviewed    Risk of Complications, Morbidity, and/or Mortality  Presenting problems: moderate  Diagnostic procedures: moderate  Management options: moderate    Patient Progress  Patient progress: stable    CritCare Time    Disposition  Final diagnoses:   UTI (urinary tract infection)     Time reflects when diagnosis was documented in both MDM as applicable and the Disposition within this note     Time User Action Codes Description Comment    1/13/2019  2:48 AM Richard Friday Add [N39 0] UTI (urinary tract infection)       ED Disposition     ED Disposition Condition Comment    Discharge  Geremias Ates discharge to home/self care      Condition at discharge: Stable        Follow-up Information     Follow up With Specialties Details Why Contact Amado Boateng MD Family Medicine Schedule an appointment as soon as possible for a visit in 1 day for follow up [id number] [facility] [id number] [phone]            Patient's Medications   Discharge Prescriptions NITROFURANTOIN (MACROBID) 100 MG CAPSULE    Take 1 capsule (100 mg total) by mouth 2 (two) times a day       Start Date: 1/13/2019 End Date: --       Order Dose: 100 mg       Quantity: 10 capsule    Refills: 0     No discharge procedures on file      ED Provider  Electronically Signed by           Elizabet Ram DO  01/13/19 1480

## 2019-01-13 NOTE — ED NOTES
Report given to [facility], [address] [facility]  01/13/19 1245

## 2019-01-19 ENCOUNTER — APPOINTMENT (EMERGENCY)
Dept: RADIOLOGY | Facility: HOSPITAL | Age: 84
DRG: 189 | End: 2019-01-19
Payer: MEDICARE

## 2019-01-19 ENCOUNTER — HOSPITAL ENCOUNTER (INPATIENT)
Facility: HOSPITAL | Age: 84
LOS: 3 days | Discharge: NURSING, REHAB OR LONG-TERM CARE | DRG: 189 | End: 2019-01-22
Attending: EMERGENCY MEDICINE | Admitting: INTERNAL MEDICINE
Payer: MEDICARE

## 2019-01-19 ENCOUNTER — APPOINTMENT (INPATIENT)
Dept: RADIOLOGY | Facility: HOSPITAL | Age: 84
DRG: 189 | End: 2019-01-19
Payer: MEDICARE

## 2019-01-19 DIAGNOSIS — J44.9 COPD (CHRONIC OBSTRUCTIVE PULMONARY DISEASE) (HCC): ICD-10-CM

## 2019-01-19 DIAGNOSIS — R06.02 SHORTNESS OF BREATH: Primary | ICD-10-CM

## 2019-01-19 DIAGNOSIS — J44.1 CHRONIC OBSTRUCTIVE PULMONARY DISEASE WITH ACUTE EXACERBATION (HCC): ICD-10-CM

## 2019-01-19 DIAGNOSIS — R91.8 LUNG MASS: ICD-10-CM

## 2019-01-19 DIAGNOSIS — R09.02 HYPOXIA: ICD-10-CM

## 2019-01-19 PROBLEM — M79.89 LEG SWELLING: Status: ACTIVE | Noted: 2019-01-19

## 2019-01-19 PROBLEM — J96.01 ACUTE RESPIRATORY FAILURE WITH HYPOXIA (HCC): Status: ACTIVE | Noted: 2019-01-19

## 2019-01-19 PROBLEM — I48.0 PAROXYSMAL ATRIAL FIBRILLATION (HCC): Status: ACTIVE | Noted: 2017-01-11

## 2019-01-19 PROBLEM — E87.6 HYPOKALEMIA: Status: ACTIVE | Noted: 2019-01-19

## 2019-01-19 LAB
ALBUMIN SERPL BCP-MCNC: 2.8 G/DL (ref 3.5–5)
ALP SERPL-CCNC: 78 U/L (ref 46–116)
ALT SERPL W P-5'-P-CCNC: 23 U/L (ref 12–78)
ANION GAP SERPL CALCULATED.3IONS-SCNC: 9 MMOL/L (ref 4–13)
APTT PPP: 30 SECONDS (ref 24–33)
ARTERIAL PATENCY WRIST A: YES
AST SERPL W P-5'-P-CCNC: 19 U/L (ref 5–45)
BACTERIA UR QL AUTO: ABNORMAL /HPF
BASE EXCESS BLDA CALC-SCNC: -1.7 MMOL/L
BASOPHILS # BLD AUTO: 0.04 THOUSANDS/ΜL (ref 0–0.1)
BASOPHILS NFR BLD AUTO: 0 % (ref 0–1)
BILIRUB SERPL-MCNC: 0.3 MG/DL (ref 0.2–1)
BILIRUB UR QL STRIP: NEGATIVE
BODY TEMPERATURE: 98.7 DEGREES FEHRENHEIT
BUN SERPL-MCNC: 24 MG/DL (ref 5–25)
CALCIUM SERPL-MCNC: 8.9 MG/DL (ref 8.3–10.1)
CHLORIDE SERPL-SCNC: 105 MMOL/L (ref 100–108)
CLARITY UR: ABNORMAL
CO2 SERPL-SCNC: 30 MMOL/L (ref 21–32)
COLOR UR: YELLOW
CREAT SERPL-MCNC: 0.78 MG/DL (ref 0.6–1.3)
EOSINOPHIL # BLD AUTO: 0.35 THOUSAND/ΜL (ref 0–0.61)
EOSINOPHIL NFR BLD AUTO: 3 % (ref 0–6)
ERYTHROCYTE [DISTWIDTH] IN BLOOD BY AUTOMATED COUNT: 11.9 % (ref 11.6–15.1)
GFR SERPL CREATININE-BSD FRML MDRD: 70 ML/MIN/1.73SQ M
GLUCOSE SERPL-MCNC: 119 MG/DL (ref 65–140)
GLUCOSE UR STRIP-MCNC: NEGATIVE MG/DL
HCO3 BLDA-SCNC: 22.8 MMOL/L (ref 22–28)
HCT VFR BLD AUTO: 44.6 % (ref 34.8–46.1)
HGB BLD-MCNC: 14 G/DL (ref 11.5–15.4)
HGB UR QL STRIP.AUTO: ABNORMAL
IMM GRANULOCYTES # BLD AUTO: 0.03 THOUSAND/UL (ref 0–0.2)
IMM GRANULOCYTES NFR BLD AUTO: 0 % (ref 0–2)
INR PPP: 0.97 (ref 0.86–1.16)
KETONES UR STRIP-MCNC: ABNORMAL MG/DL
LEUKOCYTE ESTERASE UR QL STRIP: ABNORMAL
LYMPHOCYTES # BLD AUTO: 1.32 THOUSANDS/ΜL (ref 0.6–4.47)
LYMPHOCYTES NFR BLD AUTO: 13 % (ref 14–44)
MAGNESIUM SERPL-MCNC: 2 MG/DL (ref 1.6–2.6)
MCH RBC QN AUTO: 31.6 PG (ref 26.8–34.3)
MCHC RBC AUTO-ENTMCNC: 31.4 G/DL (ref 31.4–37.4)
MCV RBC AUTO: 101 FL (ref 82–98)
MONOCYTES # BLD AUTO: 0.75 THOUSAND/ΜL (ref 0.17–1.22)
MONOCYTES NFR BLD AUTO: 7 % (ref 4–12)
NASAL CANNULA: 2
NEUTROPHILS # BLD AUTO: 7.97 THOUSANDS/ΜL (ref 1.85–7.62)
NEUTS SEG NFR BLD AUTO: 77 % (ref 43–75)
NITRITE UR QL STRIP: NEGATIVE
NON-SQ EPI CELLS URNS QL MICRO: ABNORMAL /HPF
NRBC BLD AUTO-RTO: 0 /100 WBCS
NT-PROBNP SERPL-MCNC: 884 PG/ML
O2 CT BLDA-SCNC: 16 ML/DL (ref 16–23)
OXYHGB MFR BLDA: 86.3 % (ref 94–97)
PCO2 BLDA: 37.8 MM HG (ref 36–44)
PCO2 TEMP ADJ BLDA: 38 MM HG (ref 36–44)
PH BLD: 7.4 [PH] (ref 7.35–7.45)
PH BLDA: 7.4 [PH] (ref 7.35–7.45)
PH UR STRIP.AUTO: 5.5 [PH] (ref 5–9)
PHOSPHATE SERPL-MCNC: 2.7 MG/DL (ref 2.3–4.1)
PLATELET # BLD AUTO: 285 THOUSANDS/UL (ref 149–390)
PLATELET # BLD AUTO: 301 THOUSANDS/UL (ref 149–390)
PMV BLD AUTO: 10.1 FL (ref 8.9–12.7)
PMV BLD AUTO: 9.5 FL (ref 8.9–12.7)
PO2 BLD: 53.2 MM HG (ref 75–129)
PO2 BLDA: 52.8 MM HG (ref 75–129)
POTASSIUM SERPL-SCNC: 3.3 MMOL/L (ref 3.5–5.3)
PROT SERPL-MCNC: 7.1 G/DL (ref 6.4–8.2)
PROT UR STRIP-MCNC: NEGATIVE MG/DL
PROTHROMBIN TIME: 10.2 SECONDS (ref 9.4–11.7)
RBC # BLD AUTO: 4.43 MILLION/UL (ref 3.81–5.12)
RBC #/AREA URNS AUTO: ABNORMAL /HPF
SODIUM SERPL-SCNC: 144 MMOL/L (ref 136–145)
SP GR UR STRIP.AUTO: >=1.03 (ref 1–1.03)
SPECIMEN SOURCE: ABNORMAL
TROPONIN I SERPL-MCNC: 0.02 NG/ML
TROPONIN I SERPL-MCNC: <0.02 NG/ML
UROBILINOGEN UR QL STRIP.AUTO: 0.2 E.U./DL
WBC # BLD AUTO: 10.46 THOUSAND/UL (ref 4.31–10.16)
WBC #/AREA URNS AUTO: ABNORMAL /HPF

## 2019-01-19 PROCEDURE — 73090 X-RAY EXAM OF FOREARM: CPT

## 2019-01-19 PROCEDURE — 96374 THER/PROPH/DIAG INJ IV PUSH: CPT

## 2019-01-19 PROCEDURE — 85025 COMPLETE CBC W/AUTO DIFF WBC: CPT | Performed by: EMERGENCY MEDICINE

## 2019-01-19 PROCEDURE — 94760 N-INVAS EAR/PLS OXIMETRY 1: CPT

## 2019-01-19 PROCEDURE — 71250 CT THORAX DX C-: CPT

## 2019-01-19 PROCEDURE — G8987 SELF CARE CURRENT STATUS: HCPCS

## 2019-01-19 PROCEDURE — 85049 AUTOMATED PLATELET COUNT: CPT | Performed by: INTERNAL MEDICINE

## 2019-01-19 PROCEDURE — 36415 COLL VENOUS BLD VENIPUNCTURE: CPT | Performed by: EMERGENCY MEDICINE

## 2019-01-19 PROCEDURE — 87081 CULTURE SCREEN ONLY: CPT | Performed by: STUDENT IN AN ORGANIZED HEALTH CARE EDUCATION/TRAINING PROGRAM

## 2019-01-19 PROCEDURE — 94640 AIRWAY INHALATION TREATMENT: CPT

## 2019-01-19 PROCEDURE — 99285 EMERGENCY DEPT VISIT HI MDM: CPT

## 2019-01-19 PROCEDURE — 73060 X-RAY EXAM OF HUMERUS: CPT

## 2019-01-19 PROCEDURE — 82805 BLOOD GASES W/O2 SATURATION: CPT | Performed by: EMERGENCY MEDICINE

## 2019-01-19 PROCEDURE — 97167 OT EVAL HIGH COMPLEX 60 MIN: CPT

## 2019-01-19 PROCEDURE — 84100 ASSAY OF PHOSPHORUS: CPT | Performed by: EMERGENCY MEDICINE

## 2019-01-19 PROCEDURE — G8988 SELF CARE GOAL STATUS: HCPCS

## 2019-01-19 PROCEDURE — 96361 HYDRATE IV INFUSION ADD-ON: CPT

## 2019-01-19 PROCEDURE — 73080 X-RAY EXAM OF ELBOW: CPT

## 2019-01-19 PROCEDURE — 83735 ASSAY OF MAGNESIUM: CPT | Performed by: EMERGENCY MEDICINE

## 2019-01-19 PROCEDURE — 1123F ACP DISCUSS/DSCN MKR DOCD: CPT | Performed by: INTERNAL MEDICINE

## 2019-01-19 PROCEDURE — 80053 COMPREHEN METABOLIC PANEL: CPT | Performed by: EMERGENCY MEDICINE

## 2019-01-19 PROCEDURE — 84484 ASSAY OF TROPONIN QUANT: CPT | Performed by: EMERGENCY MEDICINE

## 2019-01-19 PROCEDURE — 83880 ASSAY OF NATRIURETIC PEPTIDE: CPT | Performed by: EMERGENCY MEDICINE

## 2019-01-19 PROCEDURE — 71045 X-RAY EXAM CHEST 1 VIEW: CPT

## 2019-01-19 PROCEDURE — 99223 1ST HOSP IP/OBS HIGH 75: CPT | Performed by: INTERNAL MEDICINE

## 2019-01-19 PROCEDURE — 93005 ELECTROCARDIOGRAM TRACING: CPT

## 2019-01-19 PROCEDURE — 85610 PROTHROMBIN TIME: CPT | Performed by: EMERGENCY MEDICINE

## 2019-01-19 PROCEDURE — 81001 URINALYSIS AUTO W/SCOPE: CPT | Performed by: EMERGENCY MEDICINE

## 2019-01-19 PROCEDURE — 85730 THROMBOPLASTIN TIME PARTIAL: CPT | Performed by: EMERGENCY MEDICINE

## 2019-01-19 PROCEDURE — 36600 WITHDRAWAL OF ARTERIAL BLOOD: CPT

## 2019-01-19 PROCEDURE — 84484 ASSAY OF TROPONIN QUANT: CPT | Performed by: INTERNAL MEDICINE

## 2019-01-19 RX ORDER — ONDANSETRON 2 MG/ML
4 INJECTION INTRAMUSCULAR; INTRAVENOUS EVERY 8 HOURS PRN
Status: DISCONTINUED | OUTPATIENT
Start: 2019-01-19 | End: 2019-01-22 | Stop reason: HOSPADM

## 2019-01-19 RX ORDER — POLYETHYLENE GLYCOL 3350 17 G/17G
17 POWDER, FOR SOLUTION ORAL DAILY PRN
Status: DISCONTINUED | OUTPATIENT
Start: 2019-01-19 | End: 2019-01-22 | Stop reason: HOSPADM

## 2019-01-19 RX ORDER — METOPROLOL TARTRATE 50 MG/1
75 TABLET, FILM COATED ORAL EVERY 12 HOURS
Status: DISCONTINUED | OUTPATIENT
Start: 2019-01-19 | End: 2019-01-22 | Stop reason: HOSPADM

## 2019-01-19 RX ORDER — DONEPEZIL HYDROCHLORIDE 5 MG/1
10 TABLET, FILM COATED ORAL
Status: DISCONTINUED | OUTPATIENT
Start: 2019-01-19 | End: 2019-01-22 | Stop reason: HOSPADM

## 2019-01-19 RX ORDER — GABAPENTIN 100 MG/1
200 CAPSULE ORAL 2 TIMES DAILY
Status: DISCONTINUED | OUTPATIENT
Start: 2019-01-19 | End: 2019-01-22 | Stop reason: HOSPADM

## 2019-01-19 RX ORDER — METHYLPREDNISOLONE SODIUM SUCCINATE 125 MG/2ML
60 INJECTION, POWDER, LYOPHILIZED, FOR SOLUTION INTRAMUSCULAR; INTRAVENOUS ONCE
Status: COMPLETED | OUTPATIENT
Start: 2019-01-19 | End: 2019-01-19

## 2019-01-19 RX ORDER — ACETAMINOPHEN 325 MG/1
325 TABLET ORAL DAILY
Status: DISCONTINUED | OUTPATIENT
Start: 2019-01-19 | End: 2019-01-22 | Stop reason: HOSPADM

## 2019-01-19 RX ORDER — ACETAMINOPHEN 325 MG/1
650 TABLET ORAL EVERY 6 HOURS PRN
Status: DISCONTINUED | OUTPATIENT
Start: 2019-01-19 | End: 2019-01-22 | Stop reason: HOSPADM

## 2019-01-19 RX ORDER — METHYLPREDNISOLONE SODIUM SUCCINATE 40 MG/ML
40 INJECTION, POWDER, LYOPHILIZED, FOR SOLUTION INTRAMUSCULAR; INTRAVENOUS EVERY 8 HOURS
Status: DISCONTINUED | OUTPATIENT
Start: 2019-01-19 | End: 2019-01-21

## 2019-01-19 RX ORDER — ATORVASTATIN CALCIUM 20 MG/1
20 TABLET, FILM COATED ORAL
Status: DISCONTINUED | OUTPATIENT
Start: 2019-01-19 | End: 2019-01-22 | Stop reason: HOSPADM

## 2019-01-19 RX ORDER — DOCUSATE SODIUM 100 MG/1
200 CAPSULE, LIQUID FILLED ORAL DAILY
Status: DISCONTINUED | OUTPATIENT
Start: 2019-01-19 | End: 2019-01-22 | Stop reason: HOSPADM

## 2019-01-19 RX ORDER — IPRATROPIUM BROMIDE AND ALBUTEROL SULFATE 2.5; .5 MG/3ML; MG/3ML
3 SOLUTION RESPIRATORY (INHALATION)
Status: DISCONTINUED | OUTPATIENT
Start: 2019-01-19 | End: 2019-01-22 | Stop reason: HOSPADM

## 2019-01-19 RX ORDER — GABAPENTIN 100 MG/1
200 CAPSULE ORAL 3 TIMES DAILY
COMMUNITY

## 2019-01-19 RX ORDER — MINERAL OIL AND PETROLATUM 150; 830 MG/G; MG/G
1 OINTMENT OPHTHALMIC
Status: DISCONTINUED | OUTPATIENT
Start: 2019-01-19 | End: 2019-01-22 | Stop reason: HOSPADM

## 2019-01-19 RX ORDER — HYDROCODONE BITARTRATE AND ACETAMINOPHEN 5; 325 MG/1; MG/1
1 TABLET ORAL EVERY 6 HOURS PRN
Status: DISCONTINUED | OUTPATIENT
Start: 2019-01-19 | End: 2019-01-22 | Stop reason: HOSPADM

## 2019-01-19 RX ORDER — IPRATROPIUM BROMIDE AND ALBUTEROL SULFATE 2.5; .5 MG/3ML; MG/3ML
3 SOLUTION RESPIRATORY (INHALATION) EVERY 4 HOURS PRN
Status: DISCONTINUED | OUTPATIENT
Start: 2019-01-19 | End: 2019-01-22 | Stop reason: HOSPADM

## 2019-01-19 RX ORDER — ASPIRIN 81 MG/1
81 TABLET, CHEWABLE ORAL DAILY
Status: DISCONTINUED | OUTPATIENT
Start: 2019-01-19 | End: 2019-01-22 | Stop reason: HOSPADM

## 2019-01-19 RX ORDER — ACETAMINOPHEN 325 MG/1
325 TABLET ORAL DAILY
COMMUNITY

## 2019-01-19 RX ORDER — ALBUTEROL SULFATE 1.25 MG/3ML
1 SOLUTION RESPIRATORY (INHALATION) EVERY 4 HOURS PRN
COMMUNITY
End: 2019-01-22 | Stop reason: HOSPADM

## 2019-01-19 RX ORDER — DILTIAZEM HYDROCHLORIDE 60 MG/1
60 CAPSULE, EXTENDED RELEASE ORAL EVERY 12 HOURS SCHEDULED
Status: DISCONTINUED | OUTPATIENT
Start: 2019-01-19 | End: 2019-01-19 | Stop reason: CLARIF

## 2019-01-19 RX ORDER — CEFTRIAXONE 1 G/50ML
1000 INJECTION, SOLUTION INTRAVENOUS EVERY 24 HOURS
Status: DISCONTINUED | OUTPATIENT
Start: 2019-01-20 | End: 2019-01-20

## 2019-01-19 RX ORDER — CEFTRIAXONE 1 G/50ML
1000 INJECTION, SOLUTION INTRAVENOUS ONCE
Status: COMPLETED | OUTPATIENT
Start: 2019-01-19 | End: 2019-01-19

## 2019-01-19 RX ORDER — HEPARIN SODIUM 5000 [USP'U]/ML
5000 INJECTION, SOLUTION INTRAVENOUS; SUBCUTANEOUS EVERY 8 HOURS SCHEDULED
Status: DISCONTINUED | OUTPATIENT
Start: 2019-01-19 | End: 2019-01-22 | Stop reason: HOSPADM

## 2019-01-19 RX ADMIN — METHYLPREDNISOLONE SODIUM SUCCINATE 60 MG: 125 INJECTION, POWDER, FOR SOLUTION INTRAMUSCULAR; INTRAVENOUS at 09:26

## 2019-01-19 RX ADMIN — HEPARIN SODIUM 5000 UNITS: 5000 INJECTION, SOLUTION INTRAVENOUS; SUBCUTANEOUS at 21:24

## 2019-01-19 RX ADMIN — HEPARIN SODIUM 5000 UNITS: 5000 INJECTION, SOLUTION INTRAVENOUS; SUBCUTANEOUS at 15:13

## 2019-01-19 RX ADMIN — METHYLPREDNISOLONE SODIUM SUCCINATE 40 MG: 40 INJECTION, POWDER, FOR SOLUTION INTRAMUSCULAR; INTRAVENOUS at 21:22

## 2019-01-19 RX ADMIN — GABAPENTIN 200 MG: 100 CAPSULE ORAL at 17:44

## 2019-01-19 RX ADMIN — ATORVASTATIN CALCIUM 20 MG: 20 TABLET, FILM COATED ORAL at 17:41

## 2019-01-19 RX ADMIN — ASPIRIN 81 MG 81 MG: 81 TABLET ORAL at 15:25

## 2019-01-19 RX ADMIN — SODIUM CHLORIDE 1000 ML: 0.9 INJECTION, SOLUTION INTRAVENOUS at 09:35

## 2019-01-19 RX ADMIN — METRONIDAZOLE 500 MG: 500 INJECTION, SOLUTION INTRAVENOUS at 15:13

## 2019-01-19 RX ADMIN — AZITHROMYCIN MONOHYDRATE 500 MG: 500 INJECTION, POWDER, LYOPHILIZED, FOR SOLUTION INTRAVENOUS at 12:54

## 2019-01-19 RX ADMIN — DILTIAZEM HYDROCHLORIDE 30 MG: 30 TABLET, FILM COATED ORAL at 17:41

## 2019-01-19 RX ADMIN — DILTIAZEM HYDROCHLORIDE 30 MG: 30 TABLET, FILM COATED ORAL at 23:14

## 2019-01-19 RX ADMIN — WHITE PETROLATUM 57.7 %-MINERAL OIL 31.9 % EYE OINTMENT 1 APPLICATION: at 21:22

## 2019-01-19 RX ADMIN — IPRATROPIUM BROMIDE 0.5 MG: 0.5 SOLUTION RESPIRATORY (INHALATION) at 09:26

## 2019-01-19 RX ADMIN — METOPROLOL TARTRATE 75 MG: 50 TABLET, FILM COATED ORAL at 21:23

## 2019-01-19 RX ADMIN — IPRATROPIUM BROMIDE AND ALBUTEROL SULFATE 3 ML: 2.5; .5 SOLUTION RESPIRATORY (INHALATION) at 19:51

## 2019-01-19 RX ADMIN — METRONIDAZOLE 500 MG: 500 INJECTION, SOLUTION INTRAVENOUS at 23:15

## 2019-01-19 RX ADMIN — ALBUTEROL SULFATE 5 MG: 2.5 SOLUTION RESPIRATORY (INHALATION) at 09:26

## 2019-01-19 RX ADMIN — CEFTRIAXONE 1000 MG: 1 INJECTION, SOLUTION INTRAVENOUS at 12:24

## 2019-01-19 RX ADMIN — DONEPEZIL HYDROCHLORIDE 10 MG: 5 TABLET ORAL at 21:23

## 2019-01-19 RX ADMIN — ACETAMINOPHEN 325 MG: 325 TABLET, FILM COATED ORAL at 15:25

## 2019-01-19 NOTE — ASSESSMENT & PLAN NOTE
Noted in 2016, right upper lobe  Patient and family declined any further workup  Repeat CT with right upper lobe mass, smaller in size compared to prior    Possibly scarring or inflammatory  Follow up with Pulmonary as outpatient

## 2019-01-19 NOTE — ASSESSMENT & PLAN NOTE
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

## 2019-01-19 NOTE — ASSESSMENT & PLAN NOTE
Secondary to CVA  On puree with honey thick liquid diet  Family reports intermittent cough with meal  Speech and swallow evaluation noted yesterday recommended to continue puree with honey thick  Continue aspiration precautions

## 2019-01-19 NOTE — ASSESSMENT & PLAN NOTE
Possibly related to above  Continue supplemental oxygen   Prior imaging noted patient has significant centrilobular emphysematous changes  CT chest with advanced COPD  Continue oxygen supplementation

## 2019-01-19 NOTE — PLAN OF CARE
DISCHARGE PLANNING     Discharge to home or other facility with appropriate resources Progressing        Knowledge Deficit     Patient/family/caregiver demonstrates understanding of disease process, treatment plan, medications, and discharge instructions Progressing        Nutrition/Hydration-ADULT     Nutrient/Hydration intake appropriate for improving, restoring or maintaining nutritional needs Progressing        Potential for Falls     Patient will remain free of falls Progressing        Prexisting or High Potential for Compromised Skin Integrity     Skin integrity is maintained or improved Progressing

## 2019-01-19 NOTE — H&P
History and Physical - [facility]    Patient Information: Monik Taveras 80 y o  female MRN: [id number]  Unit/Bed#: [address] [place] [address] Encounter: [id number]  Admitting Physician: Mohamud Boogie MD  PCP: Siria Wooten MD  Date of Admission:  01/19/19        Hospital Problem List:     Principal Problem:    Chronic obstructive pulmonary disease with acute exacerbation ([facility] )  Active Problems:    Acute respiratory failure with hypoxia ([facility] )    Lung mass    Ischemic stroke ([facility] )    UTI (urinary tract infection)    Paroxysmal atrial fibrillation ([facility])    Leg swelling    HTN (hypertension)    Dysphagia    Hypokalemia      Assessment/Plan:    Acute respiratory failure with hypoxia ([facility] )   Assessment & Plan    Possibly related to above  Continue supplemental oxygen as needed  Prior imaging noted patient has significant centrilobular emphysematous changes  Check CT chest  Pulmonary evaluation     * Chronic obstructive pulmonary disease with acute exacerbation ([facility] )   Assessment & Plan    Secondary to acute bronchitis versus early pneumonia  Presented with cough shortness of breath, wheezing and leukocytosis  Concerns of aspiration secondary to dysphagia  Bronchodilators, IV steroids  IV azithromycin and Rocephin for now  Check urine Legionella pneumococcal antigen  Follow-up chest CT  Trend procalcitonin and deescalate accordingly  Aspiration precaution, chest PT  Pulmonary evaluation     Ischemic stroke [facility])   Assessment & Plan    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  Currently at her baseline  Continue aspirin, statin  PT/OT/ST     Lung mass   Assessment & Plan    Noted in 2016, right upper lobe  Patient and family declined any further workup  Will get repeat chest CT to further evaluate     Leg swelling   Assessment & Plan    Check venous Doppler     Paroxysmal atrial fibrillation ([facility] )   Assessment & Plan    In sinus rhythm  Continue metoprolol and Cardizem  Prior records reviewed  Not a candidate for anticoagulation secondary to hemorrhagic conversion of ischemic CVA and fall risk     UTI (urinary tract infection)   Assessment & Plan    Recently diagnosed at last ER visit  Treated with Macrobid       Hypokalemia   Assessment & Plan    Replete and monitor     Dysphagia   Assessment & Plan    Secondary to CVA  On puree with honey thick liquid diet  Family reports intermittent cough with meal  Will check speech and swallow evaluation  Aspiration precaution     HTN (hypertension)   Assessment & Plan    Stable  Continue Cardizem and metoprolol             VTE Prophylaxis: Heparin  / sequential compression device   Code Status: Level 3 - DNAR and DNI    Anticipated Length of Stay:  Patient will be admitted on an Inpatient basis with an anticipated length of stay of  >2 midnights  Justification for Hospital Stay:  COPD EXACERBATION SECONDARY TO BRONCHITIS    Total Time for Visit, including Counseling / Coordination of Care: 45 minutes  Greater than 50% of this total time spent on direct patient counseling and coordination of care  Chief Complaint:     Shortness of Breath (Pt arrived via squad from nursing home  Started at 0600 with SOB  Received neb at [facility]  Arrived awake and alert in no acute distress)    History of Present Illness:    Rupali Lopez is a 80 y o  female , resident of skilled nursing facility with past medical history of dementia, hypertension, dyslipidemia, COPD, right upper lobe mass, left MCA CVA with right-sided weakness, gout, arthritis who presents with episode of shortness of breath wheezing and hypoxia today morning  Patient is unable to provide details of presentation due to her comorbidities  Information was obtained from discussion with ER physician, skilled nursing facility records and daughter at bedside  Patient was in usual state of health until today when she was noted to a be short of breath associated with wheezing and noted to be hypoxic  Patient was given duo nebs and started on supplemental oxygen and subsequently sent to ER for further evaluation workup  Patient also received 1 more DuoNeb on the way to emergency room  On presentation to the emergency room patient was afebrile but noted to be tachypneic tachycardic and saturating 86% on room air  Chest x-ray did not reveal any acute abnormalities  Patient did have mild leukocytosis  Cardiac markers and EKG were unremarkable  Patient received IV steroids, bronchodilators with improvement in symptoms and patient was subsequently admitted for further evaluation workup  Patient reports feeling better at present denies any chest pain or shortness of breath  Noted denies any recent cough but she does report intermittent cough associated with oral intake  Review of Systems:    Review of Systems   Reason unable to perform ROS: Prior CVA, aphasia  Constitutional: Negative for chills and fever  HENT: Positive for trouble swallowing  Negative for congestion and rhinorrhea  Respiratory: Positive for cough, shortness of breath and wheezing  Cardiovascular: Negative for chest pain  Gastrointestinal: Negative for abdominal pain, diarrhea, nausea and vomiting  Musculoskeletal: Positive for arthralgias and gait problem  Past Medical and Surgical History:     Past Medical History:   Diagnosis Date    Arthritis     [facility]     Dementia     Emphysema lung [facility]     Gout     Gout     Hyperlipidemia     Hypertension        Past Surgical History:   Procedure Laterality Date    CATARACT EXTRACTION W/  INTRAOCULAR LENS IMPLANT Bilateral     CHOLECYSTECTOMY      ELBOW FRACTURE SURGERY      HYSTERECTOMY      JOINT REPLACEMENT Bilateral     Had bilateral hip surgery    One was repaired secondary to fracture other was replaced due to DJD       Meds/Allergies:    PTA meds:   Prior to Admission Medications   Prescriptions Last Dose Informant Patient Reported? Taking?    HYDROcodone-acetaminophen (NORCO) 5-325 mg per tablet 2019 at Unknown time  No Yes   Si tab PO 2 times a day for pain   acetaminophen (TYLENOL) 325 mg tablet 2019 at Unknown time  No Yes   Sig: Take 2 tablets by mouth every 4 (four) hours as needed for mild pain, headaches or fever   Patient taking differently: Take 325 mg by mouth daily At 1400    albuterol (ACCUNEB) 1 25 MG/3ML nebulizer solution Unknown at Unknown time  Yes No   Sig: Take 1 ampule by nebulization every 4 (four) hours as needed for wheezing   albuterol (PROVENTIL HFA,VENTOLIN HFA) 90 mcg/act inhaler 2019 at Unknown time  Yes Yes   Sig: Inhale 2 puffs every 6 (six) hours as needed for wheezing   artificial tear (LUBRIFRESH P M ) 83-15 % ophthalmic ointment 2019 at Unknown time  No Yes   Sig: Administer 1 application to both eyes daily at bedtime for 30 days   aspirin 81 MG tablet 2019 at Unknown time  Yes Yes   Sig: Take 81 mg by mouth daily   bisacodyl (DULCOLAX) 10 mg suppository   No No   Sig: Insert 1 suppository into the rectum daily as needed for constipation for up to 30 days   diltiazem (CARDIZEM SR) 60 mg 12 hr capsule 2019 at Unknown time  No Yes   Sig: Take 1 capsule by mouth every 12 (twelve) hours for 30 days   docusate sodium (COLACE) 100 mg capsule 2019 at Unknown time  Yes Yes   Sig: Take 200 mg by mouth daily   donepezil (ARICEPT) 10 mg tablet 2019 at Unknown time  Yes Yes   Sig: Take 10 mg by mouth daily at bedtime   gabapentin (NEURONTIN) 100 mg capsule 2019 at Unknown time  Yes Yes   Sig: Take 200 mg by mouth 3 (three) times a day   metoprolol tartrate 75 MG TABS 2019 at Unknown time  No Yes   Sig: Take 1 tablet by mouth every 12 (twelve) hours for 30 days   Patient taking differently: Take 75 mg by mouth 2 (two) times a day With meals    nitrofurantoin (MACROBID) 100 mg capsule 2019 at Unknown time  No Yes   Sig: Take 1 capsule (100 mg total) by mouth 2 (two) times a day   rosuvastatin (CRESTOR) 10 MG tablet 1/18/2019 at Unknown time  Yes Yes   Sig: Take 10 mg by mouth daily      Facility-Administered Medications: None       Allergies: Allergies   Allergen Reactions    Sulfa Antibiotics      History:     Marital Status:      Substance Use History:   History   Alcohol Use No     History   Smoking Status    Former Smoker    Packs/day: 1 00    Years: 25 00    Types: Cigarettes    Quit date: 1976   Smokeless Tobacco    Never Used     Comment: Patient former smoker, quit 1976     History   Drug Use No       Family History:    Family History   Problem Relation Age of Onset    Heart attack Father 45       Physical Exam:     Vitals:   Blood Pressure: 133/60 (01/19/19 1722)  Pulse: 100 (01/19/19 1722)  Temperature: 98 5 °F (36 9 °C) (01/19/19 1722)  Temp Source: Oral (01/19/19 1722)  Respirations: 20 (01/19/19 1722)  Height: 5' 4" (162 6 cm) (01/19/19 1340)  Weight - Scale: 45 4 kg (100 lb 1 4 oz) (01/19/19 0858)  SpO2: 92 % (01/19/19 1722)    Physical Exam   Constitutional: She appears well-developed  No distress  Thin built, cachectic   HENT:   Head: Normocephalic and atraumatic  Nose: Nose normal    Dry mucosa   Eyes: Pupils are equal, round, and reactive to light  Conjunctivae are normal    Neck: Normal range of motion  Neck supple  Cardiovascular: Normal rate, regular rhythm and normal heart sounds  Pulmonary/Chest: Effort normal  No accessory muscle usage  No respiratory distress  She has decreased breath sounds  She has wheezes in the right upper field and the right middle field  She has no rhonchi  She has no rales  Abdominal: Soft  Bowel sounds are normal  She exhibits no distension  There is no tenderness  There is no rebound and no guarding  Musculoskeletal: She exhibits edema (Lower extremity, trace)  Diffuse arthritis changes   Neurological: She is alert  No cranial nerve deficit  GCS eye subscore is 4   GCS motor subscore is 6  Right-sided weakness and contracture associated with aphasia from prior stroke   Skin: Skin is warm and dry  No rash noted  Ecchymosis on left side of the body from recent fall               Lab Results: I have personally reviewed pertinent reports  Results from last 7 days  Lab Units 01/19/19  0927   WBC Thousand/uL 10 46*   HEMOGLOBIN g/dL 14 0   HEMATOCRIT % 44 6   PLATELETS Thousands/uL 301   NEUTROS PCT % 77*   LYMPHS PCT % 13*   MONOS PCT % 7   EOS PCT % 3       Results from last 7 days  Lab Units 01/19/19  0926   POTASSIUM mmol/L 3 3*   CHLORIDE mmol/L 105   CO2 mmol/L 30   BUN mg/dL 24   CREATININE mg/dL 0 78   CALCIUM mg/dL 8 9   ALK PHOS U/L 78   ALT U/L 23   AST U/L 19       Results from last 7 days  Lab Units 01/19/19  0926   INR  0 97       Imaging: I have personally reviewed pertinent reports  Xr Chest 1 View Portable    Result Date: 1/13/2019  Narrative: CHEST INDICATION:   One view chest 1/2/2017  COMPARISON:  None EXAM PERFORMED/VIEWS:  XR CHEST PORTABLE FINDINGS: Normal cardiac silhouette  Aortic calcification is present  No airspace consolidation, pneumothorax, pulmonary edema, or pleural effusion  Previously noted right apical opacity not visualized on this radiograph  Dextroconvex thoracic and levoconvex lumbar scoliosis  Degenerative changes bilateral acromioclavicular and glenohumeral joints  Chronic bilateral rotator cuff tears  Impression: No radiographic evidence of acute intrathoracic process  Previously noted right apical opacity not visualized on this radiograph  Workstation performed: [id number]     Xr Humerus Left    Addendum Date: 1/19/2019 Addendum:   ADDENDUM: The posterior aspect of the distal humerus is not seen on the lateral study  As such, this is incompletely  Please correlate with the reading for the elbow study  This should include evaluation of the portion of humerus missing from this study      Result Date: 1/19/2019  Narrative: LEFT HUMERUS INDICATION:   Ecchymosis  COMPARISON:  None VIEWS:  XR HUMERUS LEFT Images: 2 FINDINGS: There is no acute fracture or dislocation  There is impressive degenerative change of the glenohumeral joint  The rotator cuff interval is quite narrowed  There is associated scalloping of the undersurface of the acromion  This would be consistent with a chronic rotator cuff tear  No lytic or blastic lesions are seen  Soft tissues are unremarkable  Impression: Osteoarthritis and chronic rotator cuff tear Workstation performed: [id number]     Xr Hip/pelv 2-3 Vws Right    Result Date: 1/13/2019  Narrative: RIGHT HIP INDICATION:   hip pain  COMPARISON:  Right hip radiographs 5/22/2017 VIEWS:  XR HIP/PELV 2-3 VWS RIGHT W PELVIS IF PERFORMED FINDINGS: There is no acute fracture or dislocation  Mild right hip osteoarthritis is seen  Minimal chronic sclerosis and subchondral lucency right femoral head may be sequela of chronic osteoarthritis or AVN  No significant interval change  3 metallic screws in the right femoral head and neck  Left hip prosthesis is intact  No secondary signs of loosening or infection  No lytic or blastic osseous lesions  Heterotopic ossification adjacent to the left acetabulum and proximal femur  Bilateral greater trochanter enthesopathy  Degenerative changes pubic symphysis and visualized lower lumbar spine  Abundant stool in the rectal vault  Impression: No acute osseous abnormality  Degenerative changes as described  Abundant stool in the rectal vault  Workstation performed: [id number]     Ct Head Without Contrast    Result Date: 1/13/2019  Narrative: CT BRAIN - WITHOUT CONTRAST INDICATION:   trauma  COMPARISON:  CT of the head on May 22, 2017  TECHNIQUE:  CT examination of the brain was performed  In addition to axial images, coronal 2D reformatted images were created and submitted for interpretation  Radiation dose length product (DLP) for this visit:  1375 28 mGy-cm     This examination, like all CT scans performed in the [facility], was performed utilizing techniques to minimize radiation dose exposure, including the use of iterative reconstruction and automated exposure control  IMAGE QUALITY:  Diagnostic  FINDINGS: PARENCHYMA: Decreased attenuation is noted in periventricular and subcortical white matter demonstrating an appearance that is statistically most likely to represent moderate microangiopathic change  Redemonstrated right basal ganglia chronic lacunar infarcts  Redemonstrated chronic left middle cerebral artery territory infarction  No CT signs of acute infarction  No intracranial mass, mass effect or midline shift  No acute parenchymal hemorrhage  VENTRICLES AND EXTRA-AXIAL SPACES:  Normal for the patient's age  VISUALIZED ORBITS AND PARANASAL SINUSES:  Small mucous retention cyst in the left maxillary sinus  CALVARIUM AND EXTRACRANIAL SOFT TISSUES:  Normal      Impression: 1  No intracranial hemorrhage or calvarial fracture  2   Chronic small vessel ischemic changes  3   Redemonstrated chronic left middle cerebral artery territory infarction  Workstation performed: [id number]     Ct Cervical Spine Without Contrast    Result Date: 1/13/2019  Narrative: CT CERVICAL SPINE - WITHOUT CONTRAST INDICATION:   trauma  COMPARISON:  CT of the chest on December 20, 2016  TECHNIQUE:  CT examination of the cervical spine was performed without intravenous contrast   Contiguous axial images were obtained  Sagittal and coronal reconstructions were performed  Radiation dose length product (DLP) for this visit:  320 mGy-cm   This examination, like all CT scans performed in the [facility], was performed utilizing techniques to minimize radiation dose exposure, including the use of iterative reconstruction and automated exposure control  IMAGE QUALITY:  Diagnostic  FINDINGS: ALIGNMENT:  Normal alignment of the cervical spine    Grade 1 anterolisthesis of C3 on C4 likely degenerative    VERTEBRAL BODIES:  No acute fracture  DEGENERATIVE CHANGES:  Moderate multilevel cervical degenerative changes are noted  No critical central canal stenosis  Degenerative changes are most pronounced at C5-C6 and C6-C7 where there is mild bony spinal canal and moderate neural foraminal narrowing  PREVERTEBRAL AND PARASPINAL SOFT TISSUES:  Unremarkable  THORACIC INLET:  1 2 x 0 9 cm mass in the right upper lobe decreased in size since 12/20/2016  Impression: No cervical spine fracture or traumatic malalignment  Workstation performed: [id number]       XR humerus LEFT   Final Result   Addendum 1 of 1   ADDENDUM:      The posterior aspect of the distal humerus is not seen on the lateral    study  As such, this is incompletely  Please correlate with the reading    for the elbow study  This should include evaluation of the portion of    humerus missing from this study  Final      Osteoarthritis and chronic rotator cuff tear            Workstation performed: [id number]         XR elbow 3+ views LEFT    (Results Pending)   XR forearm 2 views LEFT    (Results Pending)   XR chest 1 view    (Results Pending)       EKG, Pathology, and Other Studies Reviewed on Admission:   · EKG-normal sinus rhythm at 90 beats per minute without any acute ST T-wave changes  Allscripts/EPIC Records Reviewed: Yes     ** Please Note: "This note has been constructed using a voice recognition system  Therefore there may be syntax, spelling, and/or grammatical errors   Please call if you have any questions  "**

## 2019-01-19 NOTE — ASSESSMENT & PLAN NOTE
Secondary to acute bronchitis   Presented with cough shortness of breath, wheezing and leukocytosis  Concerns of aspiration secondary to dysphagia  Improving clinically  Bronchodilators  Patient received IV Solu-Medrol which will be changed to prednisone taper  Continue azithromycin for another day , DCed Rocephin  Chest CT with changes of advanced COPD, no acute infiltrates    Trend procalcitonin   Continue supplemental oxygen as tolerated  Aspiration precaution, chest PT  Follow up with Pulmonary

## 2019-01-19 NOTE — OCCUPATIONAL THERAPY NOTE
OT EVALUATION     01/19/19 8678   Note Type   Note type Eval only   Restrictions/Precautions   Other Precautions Fall Risk;Bed Alarm; Chair Alarm  (nonfunctional RUE, expressive aphasia)   Pain Assessment   Pain Assessment No/denies pain   Home Living   Type of Home SNF  [facility] )   [address] [facility]\""   Additional Comments daughter present for session  She reports pt is able to feed self with setup  Nursing home staff does not get patient OOB, daughter dies daily with a stand pivot  She reports unable to use R UE due to CVA and a shoulder injury history  Daughter and son visit patient daily at [facility]  Prior Function   Level of [place] Needs assistance with ADLs and functional mobility; Needs assistance with IADLs   Lives With Facility staff   Receives Help From Personal care attendant   ADL Assistance Needs assistance   IADLs Needs assistance   Falls in the last 6 months 1 to 4  (OOB per daughter)   ADL   Eating Assistance 4  Minimal Assistance   Eating Deficit Setup  (spillage)   Grooming Assistance 3  Moderate Assistance   UB Bathing Assistance 2  Maximal Assistance   LB Bathing Assistance 1  Total Assistance   [address] 2  Maximal Assistance    [facility] 1  Total Assistance   Toileting Assistance  1  Total Assistance   Bed Mobility   Rolling R 2  Maximal assistance   Rolling L 2  Maximal assistance   Transfers   Additional Comments pt declined OOB will continued to assess   Activity Tolerance   Activity Tolerance Patient limited by fatigue   Medical Staff Made Aware social work of discharge plan back to [facility] Assessment   RUE Assessment (non-functional from CVA hx, MMT 0/5)   LUE Overall AROM   L Shoulder Flexion crepitus in L shoulder, pain with attempt to range, no active ROM   L Elbow Flexion WFL, MMT 3+/5   L Mass Grasp WFL, MMT 3+/5   Cognition   Overall Cognitive Status Impaired   Arousal/Participation Cooperative   Attention Attends with cues to redirect   Orientation Level Oriented to person   Following Commands Follows one step commands inconsistently   Comments pt with expressive aphasia,    Assessment   Limitation Decreased ADL status; Decreased UE strength;Decreased UE ROM; Decreased Safe judgement during ADL;Decreased cognition;Decreased endurance;Decreased self-care trans;Decreased high-level ADLs  (decreased balance)   Prognosis Fair   Assessment Patient evaluated by Occupational Therapy  Patient admitted with SOB  The patients occupational profile, medical and therapy history includes a extensive additional review of physical, cognitive, or psychosocial history related to current functional performance  Comorbidities affecting functional mobility and ADLS include: arthritis, CVA, dementia, gout and hypertension  Prior to admission, patient was dependent for mobility, requiring assist for ADLS, requiring assist for IADLS and a resident of long term care  The evaluation identifies the following performance deficits: weakness, decreased ROM, impaired balance, decreased endurance, increased fall risk, new onset of impairment of functional mobility, decreased ADLS, decreased IADLS, decreased activity tolerance, decreased safety awareness, impaired judgement, SOB upon exertion, decreased cognition and decreased strength, that result in activity limitations and/or participation restrictions  This evaluation requires clinical decision making of high complexity, because the patient presents with comorbidites that affect occupational performance and required significant modification of tasks or assistance with consideration of multiple treatment options  The Barthel Index was used as a functional outcome tool presenting with a score of 5, indicating marked limitations of functional mobility and ADLS    Patient will benefit from skilled Occupational Therapy services to address above deficits and facilitate a safe return to prior level of function  Goals   Patient Goals unable to state due to cognitive impairment   STG Time Frame (1-7 days)   Short Term Goal  Patient will increase standing tolerance to 1 minutes during ADL task to decrease assistance level and decrease fall risk; Patient will increase bed mobility to max assist in preparation for ADLS and transfers; Patient will tolerate 10 minutes of UE ROM/strengthening to increase general activity tolerance and performance in ADLS/IADLS; Patient will improve functional activity tolerance to 10 minutes of sustained functional tasks to increase participation in basic self-care and decrease assistance level;  Patient will be able to to verbalize understanding and perform energy conservation/proper body mechanics during ADLS and functional mobility at least 75% of the time with minimal cueing to decrease signs of fatigue and increase stamina to return to prior level of function; Patient will increase dynamic sitting balance to fair- to improve the ability to sit at edge of bed or on a chair for ADLS;  Patient will increase dynamic standing balance to poor+ to improve postural stability and decrease fall risk during standing ADLS and transfers  LTG Time Frame (8-14 days)   Long Term Goal Patient will increase standing tolerance to 2 minutes during ADL task to decrease assistance level and decrease fall risk; Patient will increase bed mobility to mod assist in preparation for ADLS and transfers;   Patient will tolerate 20 minutes of UE ROM/strengthening to increase general activity tolerance and performance in ADLS/IADLS; Patient will improve functional activity tolerance to 20 minutes of sustained functional tasks to increase participation in basic self-care and decrease assistance level;  Patient will be able to to verbalize understanding and perform energy conservation/proper body mechanics during ADLS and functional mobility at least 90% of the time without cueing to decrease signs of fatigue and increase stamina to return to prior level of function; Patient will increase dynamic sitting balance to fair to improve the ability to sit at edge of bed or on a chair for ADLS;  Patient will increase dynamic standing balance to fair- to improve postural stability and decrease fall risk during standing ADLS and transfers  Functional Transfer Goals   Pt Will Perform All Functional Transfers (STG max assist LTG mod assist )   ADL Goals   Pt Will Perform Eating (STG supervision LTG independent )   Pt Will Perform Grooming (STG min assist LTG supervision )   Pt Will Perform Bathing (STG max assist LTG mod assist )   Plan   Treatment Interventions ADL retraining;Functional transfer training;UE strengthening/ROM; Endurance training;Patient/family training;Equipment evaluation/education; Activityengagement; Compensatory technique education   Goal Expiration Date 02/02/19   OT Frequency 2-3x/wk   Recommendation   OT Discharge Recommendation 24 hour supervision/assist  (return to SNF/[facility])   Barthel Index   Feeding 5   Bathing 0   Grooming Score 0   Dressing Score 0   Bladder Score 0   Bowels Score 0   Toilet Use Score 0   Transfers (Bed/Chair) Score 0   Mobility (Level Surface) Score 0   Stairs Score 0   Barthel Index Score 5   Licensure   [place] License Number  Sumannaman Adalberto Pereyra Juan 87 OTR/L [id number]

## 2019-01-19 NOTE — ASSESSMENT & PLAN NOTE
In sinus rhythm  Continue metoprolol and Cardizem  Prior records reviewed  Not a candidate for anticoagulation secondary to hemorrhagic conversion of ischemic CVA and fall risk

## 2019-01-19 NOTE — ED PROVIDER NOTES
History  Chief Complaint   Patient presents with    Shortness of Breath     Pt arrived via squad from nursing home  Started at 0600 with SOB  Received neb at [facility]  Arrived awake and alert in no acute distress     59-year-old female with past medical history of dementia, hypertension, hyperlipidemia, CVA, gout, arthritis, COPD, presents to the ER with acute onset shortness of breath since this morning  Patient was given a DuoNeb treatment in the nursing home  Patient was hypoxic to room air and was placed on 2 L nasal cannula  Upon EMS arrival patient was satting 93% on 2 L nasal cannula oxygen  Patient sent to the ER for further evaluation  Patient given 1 DuoNeb on route to the ER  Further history and review of system is limited due to patient's dementia  History provided by:  EMS personnel  Shortness of Breath       Prior to Admission Medications   Prescriptions Last Dose Informant Patient Reported? Taking?    HYDROcodone-acetaminophen (NORCO) 5-325 mg per tablet   No No   Si tab PO 2 times a day for pain   acetaminophen (TYLENOL) 325 mg tablet 2019 at Unknown time  No Yes   Sig: Take 2 tablets by mouth every 4 (four) hours as needed for mild pain, headaches or fever   Patient taking differently: Take 325 mg by mouth daily At 1400    albuterol (ACCUNEB) 1 25 MG/3ML nebulizer solution   Yes Yes   Sig: Take 1 ampule by nebulization every 4 (four) hours as needed for wheezing   albuterol (PROVENTIL HFA,VENTOLIN HFA) 90 mcg/act inhaler 2019 at Unknown time  Yes Yes   Sig: Inhale 2 puffs every 6 (six) hours as needed for wheezing   artificial tear (LUBRIFRESH P M ) 83-15 % ophthalmic ointment 2019 at Unknown time  No Yes   Sig: Administer 1 application to both eyes daily at bedtime for 30 days   aspirin 81 MG tablet 2019 at Unknown time  Yes Yes   Sig: Take 81 mg by mouth daily   bisacodyl (DULCOLAX) 10 mg suppository   No No   Sig: Insert 1 suppository into the rectum daily as needed for constipation for up to 30 days   diltiazem (CARDIZEM SR) 60 mg 12 hr capsule 1/18/2019 at Unknown time  No Yes   Sig: Take 1 capsule by mouth every 12 (twelve) hours for 30 days   docusate sodium (COLACE) 100 mg capsule 1/18/2019 at Unknown time  Yes Yes   Sig: Take 200 mg by mouth daily   donepezil (ARICEPT) 10 mg tablet 1/18/2019 at Unknown time  Yes Yes   Sig: Take 10 mg by mouth daily at bedtime   gabapentin (NEURONTIN) 100 mg capsule 1/18/2019 at Unknown time  Yes Yes   Sig: Take 200 mg by mouth 3 (three) times a day   metoprolol tartrate 75 MG TABS 1/18/2019 at Unknown time  No Yes   Sig: Take 1 tablet by mouth every 12 (twelve) hours for 30 days   Patient taking differently: Take 75 mg by mouth 2 (two) times a day With meals    nitrofurantoin (MACROBID) 100 mg capsule 1/18/2019 at Unknown time  No Yes   Sig: Take 1 capsule (100 mg total) by mouth 2 (two) times a day   rosuvastatin (CRESTOR) 10 MG tablet 1/18/2019 at Unknown time  Yes Yes   Sig: Take 10 mg by mouth daily      Facility-Administered Medications: None       Past Medical History:   Diagnosis Date    Arthritis     [facility] [address] [facility]     Dementia     Emphysema lung ([facility] [address] )     Gout     Gout     Hyperlipidemia     Hypertension        Past Surgical History:   Procedure Laterality Date    CATARACT EXTRACTION W/  INTRAOCULAR LENS IMPLANT Bilateral     CHOLECYSTECTOMY      ELBOW FRACTURE SURGERY      HYSTERECTOMY      JOINT REPLACEMENT Bilateral     Had bilateral hip surgery  One was repaired secondary to fracture other was replaced due to DJD       Family History   Problem Relation Age of Onset    Heart attack Father 45     I have reviewed and agree with the history as documented      Social History   Substance Use Topics    Smoking status: Former Smoker     Packs/day: 1 00     Years: 25 00     Types: Cigarettes     Quit date: 1976    Smokeless tobacco: Never Used      Comment: Patient former smoker, quit 1976    Alcohol use No        Review of Systems   Unable to perform ROS: Dementia   Respiratory: Positive for shortness of breath  All other systems reviewed and are negative  Physical Exam  Physical Exam   Constitutional: She appears well-developed and well-nourished  Pleasantly demented     HENT:   Head: Normocephalic and atraumatic  Mouth/Throat: Oropharynx is clear and moist    Eyes: Conjunctivae and EOM are normal    Neck: Normal range of motion  Neck supple  Cardiovascular: Normal rate, regular rhythm, normal heart sounds and intact distal pulses  Pulmonary/Chest: Effort normal    Scattered crackles noted bilateral    Abdominal: Soft  Bowel sounds are normal  She exhibits no distension  There is no tenderness  Musculoskeletal: Normal range of motion  Ecchymosis noted to the lateral aspect of distal humerus, elbow, proximal forearm on the left upper extremity  Pulses intact bilateral upper and lower extremities   Neurological: She is alert  Spontaneous movement noted to bilateral upper and lower extremities  No obvious focal neuro deficits noted  Neuro exam is limited due to baseline dementia  Skin: Skin is warm and dry  Dry mucous membranes noted on exam    Psychiatric: She has a normal mood and affect  Her behavior is normal  Judgment and thought content normal    Nursing note and vitals reviewed        Vital Signs  ED Triage Vitals [01/19/19 0858]   Temperature Pulse Respirations Blood Pressure SpO2   97 8 °F (36 6 °C) 86 18 157/73 93 %      Temp src Heart Rate Source Patient Position - Orthostatic VS BP Location FiO2 (%)   -- -- -- -- --      Pain Score       No Pain           Vitals:    01/19/19 1015 01/19/19 1045 01/19/19 1230 01/19/19 1245   BP:       Pulse: (!) 110 (!) 114 (!) 114 100       Visual Acuity      ED Medications  Medications   azithromycin (ZITHROMAX) 500 mg in sodium chloride 0 9% 250mL IVPB 500 mg (500 mg Intravenous New Bag 1/19/19 1254)   sodium chloride 0 9 % bolus 1,000 mL (0 mL Intravenous Stopped 1/19/19 1041)   albuterol inhalation solution 5 mg (5 mg Nebulization Given 1/19/19 0926)   ipratropium (ATROVENT) 0 02 % inhalation solution 0 5 mg (0 5 mg Nebulization Given 1/19/19 0926)   methylPREDNISolone sodium succinate (Solu-MEDROL) injection 60 mg (60 mg Intravenous Given 1/19/19 0926)   cefTRIAXone (ROCEPHIN) IVPB (premix) 1,000 mg (0 mg Intravenous Stopped 1/19/19 1254)       Diagnostic Studies  Results Reviewed     Procedure Component Value Units Date/Time    Urine Microscopic [[id number]]  (Abnormal) Collected:  01/19/19 1243    Lab Status:  Final result Specimen:  Urine from Urine, Straight Cath Updated:  01/19/19 1259     RBC, UA 2-4 (A) /hpf      WBC, UA 4-10 (A) /hpf      Epithelial Cells Occasional /hpf      Bacteria, UA None Seen /hpf     UA w Reflex to Microscopic w Reflex to Culture [[id number]]  (Abnormal) Collected:  01/19/19 1243    Lab Status:  Final result Specimen:  Urine from Urine, Straight Cath Updated:  01/19/19 1251     Color, UA Yellow     Clarity, UA Slightly Cloudy     Specific Gravity, UA >=1 030     pH, UA 5 5     Leukocytes, UA Trace (A)     Nitrite, UA Negative     Protein, UA Negative mg/dl      Glucose, UA Negative mg/dl      Ketones, UA 15 (1+) (A) mg/dl      Urobilinogen, UA 0 2 E U /dl      Bilirubin, UA Negative     Blood, UA Small (A)    Blood gas, arterial [[id number]]  (Abnormal) Collected:  01/19/19 1056    Lab Status:  Final result Specimen:  Blood, Arterial from Radial, Left Updated:  01/19/19 1105     pH, Arterial 7 398     PH ART TC 7 397     pCO2, Arterial 37 8 mm Hg      PCO2 (TC) Arterial 38 0 mm Hg      pO2, Arterial 52 8 (LL) mm Hg      PO2 (TC) Arterial 53 2 (LL) mm Hg      HCO3, Arterial 22 8 mmol/L      Base Excess, Arterial -1 7 mmol/L      O2 Content, Arterial 16 0 mL/dL      O2 HGB,Arterial  86 3 (L) %      SOURCE Radial, Left     MELISSA TEST Yes     Temperature 98 7 Degrees Fehrenheit      Non Vent Type- Aerosol Mask --     Nasal Cannula 2    B-type natriuretic peptide [[id number]]  (Abnormal) Collected:  01/19/19 0926    Lab Status:  Final result Specimen:  Blood from Arm, Left Updated:  01/19/19 1001     NT-proBNP 884 (H) pg/mL     Troponin I [[id number]]  (Normal) Collected:  01/19/19 0926    Lab Status:  Final result Specimen:  Blood from Arm, Left Updated:  01/19/19 1000     Troponin I <0 02 ng/mL     Protime-INR [[id number]]  (Normal) Collected:  01/19/19 0926    Lab Status:  Final result Specimen:  Blood from Arm, Left Updated:  01/19/19 0956     Protime 10 2 seconds      INR 0 97    APTT [[id number]]  (Normal) Collected:  01/19/19 0926    Lab Status:  Final result Specimen:  Blood from Arm, Left Updated:  01/19/19 0956     PTT 30 seconds     Comprehensive metabolic panel [[id number]]  (Abnormal) Collected:  01/19/19 0926    Lab Status:  Final result Specimen:  Blood from Arm, Left Updated:  01/19/19 0955     Sodium 144 mmol/L      Potassium 3 3 (L) mmol/L      Chloride 105 mmol/L      CO2 30 mmol/L      ANION GAP 9 mmol/L      BUN 24 mg/dL      Creatinine 0 78 mg/dL      Glucose 119 mg/dL      Calcium 8 9 mg/dL      AST 19 U/L      ALT 23 U/L      Alkaline Phosphatase 78 U/L      Total Protein 7 1 g/dL      Albumin 2 8 (L) g/dL      Total Bilirubin 0 30 mg/dL      eGFR 70 ml/min/1 73sq m     Narrative:         National Kidney Disease Education Program recommendations are as follows:  GFR calculation is accurate only with a steady state creatinine  Chronic Kidney disease less than 60 ml/min/1 73 sq  meters  Kidney failure less than 15 ml/min/1 73 sq  meters      Phosphorus [[id number]]  (Normal) Collected:  01/19/19 0926    Lab Status:  Final result Specimen:  Blood from Arm, Left Updated:  01/19/19 0955     Phosphorus 2 7 mg/dL     Magnesium [[id number]]  (Normal) Collected:  01/19/19 0926    Lab Status:  Final result Specimen:  Blood from Arm, Left Updated:  01/19/19 0955     Magnesium 2 0 mg/dL     CBC and differential [[id number]]  (Abnormal) Collected:  01/19/19 0927    Lab Status:  Final result Specimen:  Blood from Arm, Left Updated:  01/19/19 0939     WBC 10 46 (H) Thousand/uL      RBC 4 43 Million/uL      Hemoglobin 14 0 g/dL      Hematocrit 44 6 %       (H) fL      MCH 31 6 pg      MCHC 31 4 g/dL      RDW 11 9 %      MPV 9 5 fL      Platelets 876 Thousands/uL      nRBC 0 /100 WBCs      Neutrophils Relative 77 (H) %      Immat GRANS % 0 %      Lymphocytes Relative 13 (L) %      Monocytes Relative 7 %      Eosinophils Relative 3 %      Basophils Relative 0 %      Neutrophils Absolute 7 97 (H) Thousands/µL      Immature Grans Absolute 0 03 Thousand/uL      Lymphocytes Absolute 1 32 Thousands/µL      Monocytes Absolute 0 75 Thousand/µL      Eosinophils Absolute 0 35 Thousand/µL      Basophils Absolute 0 04 Thousands/µL                  XR humerus LEFT   Final Result by Ashanti Peck MD (01/19 1037)   Addendum 1 of 1 by Ashanti Peck MD (01/19 1039)   ADDENDUM:      The posterior aspect of the distal humerus is not seen on the lateral    study  As such, this is incompletely  Please correlate with the reading    for the elbow study  This should include evaluation of the portion of    humerus missing from this study        Final      Osteoarthritis and chronic rotator cuff tear            Workstation performed: [id number]         XR elbow 3+ views LEFT    (Results Pending)   XR forearm 2 views LEFT    (Results Pending)   XR chest 1 view    (Results Pending)              Procedures  ECG 12 Lead Documentation  Date/Time: 1/19/2019 9:09 AM  Performed by: Jay Meadows  Authorized by: Jay Meadows     Indications / Diagnosis:  Shortness  ECG reviewed by me, the ED Provider: yes    Patient location:  ED  Previous ECG:     Previous ECG:  Compared to current    Similarity:  Changes noted    Comparison to cardiac monitor: Yes    Interpretation:     Interpretation: normal    Comments:      Sinus rhythm, rate 90, normal axis, normal intervals, no acute ST/T-wave abnormalities noted, previous sinusitis bradycardia has since improved  Otherwise unremarkable EKG  Phone Contacts  ED Phone Contact    ED Course  ED Course as of Jan 19 1313   Sat Jan 19, 2019   1141 Patient re-examined at bedside  Shortness of breath and wheezing improved with steroids and neb treatment  ABG shows hypoxia  At this point patient will be admitted for further management  Patient and family agrees with admission plans  MDM  Number of Diagnoses or Management Options  COPD (chronic obstructive pulmonary disease) ([facility] ): new and requires workup  Hypoxia: new and requires workup  Shortness of breath: new and requires workup  Diagnosis management comments: Obtain blood work, ABG, EKG, chest x-ray  Give IV fluids, steroids, neb treatment and reassess symptoms  Amount and/or Complexity of Data Reviewed  Clinical lab tests: ordered and reviewed  Tests in the radiology section of CPT®: ordered and reviewed  Tests in the medicine section of CPT®: ordered and reviewed  Review and summarize past medical records: yes  Independent visualization of images, tracings, or specimens: yes    Risk of Complications, Morbidity, and/or Mortality  General comments: Patient's ABG shows concern for hypoxia  Patient is satting 86% on room air  Oxygenation improved with neb treatment, steroids, supplemental nasal cannula oxygen therapy  X-ray does not show any acute infiltrate however cannot rule out a developing pneumonia  At this point empiric antibiotics were started and patient is admitted for further management  Patient and family agrees with admission plans      Patient Progress  Patient progress: stable    CritCare Time    Disposition  Final diagnoses:   Shortness of breath   Hypoxia   COPD (chronic obstructive pulmonary disease) ([facility] )     Time reflects when diagnosis was documented in both MDM as applicable and the Disposition within this note     Time User Action Codes Description Comment    1/19/2019 11:45 AM Amanda Reagan Add [R06 02] Shortness of breath     1/19/2019 11:45 AM Amanda Reagan [R09 02] Hypoxia     1/19/2019 11:46 AM Amanda Reagan Add [J44 9] COPD (chronic obstructive pulmonary disease) [facility])       ED Disposition     ED Disposition Condition Comment    Admit  Case was discussed with Dr Baltazar Bergeron and the patient's admission status was agreed to be Admission Status: inpatient status to the service of Dr Baltazar Bergeron  Follow-up Information    None         Patient's Medications   Discharge Prescriptions    No medications on file     No discharge procedures on file      ED Provider  Electronically Signed by           Gisela Guerra DO  01/19/19 2652

## 2019-01-20 LAB
ANION GAP SERPL CALCULATED.3IONS-SCNC: 9 MMOL/L (ref 4–13)
BASOPHILS # BLD AUTO: 0.01 THOUSANDS/ΜL (ref 0–0.1)
BASOPHILS NFR BLD AUTO: 0 % (ref 0–1)
BUN SERPL-MCNC: 22 MG/DL (ref 5–25)
CALCIUM SERPL-MCNC: 8.8 MG/DL (ref 8.3–10.1)
CHLORIDE SERPL-SCNC: 107 MMOL/L (ref 100–108)
CO2 SERPL-SCNC: 27 MMOL/L (ref 21–32)
CREAT SERPL-MCNC: 0.64 MG/DL (ref 0.6–1.3)
EOSINOPHIL # BLD AUTO: 0 THOUSAND/ΜL (ref 0–0.61)
EOSINOPHIL NFR BLD AUTO: 0 % (ref 0–6)
ERYTHROCYTE [DISTWIDTH] IN BLOOD BY AUTOMATED COUNT: 11.9 % (ref 11.6–15.1)
GFR SERPL CREATININE-BSD FRML MDRD: 82 ML/MIN/1.73SQ M
GLUCOSE SERPL-MCNC: 174 MG/DL (ref 65–140)
HCT VFR BLD AUTO: 39.1 % (ref 34.8–46.1)
HGB BLD-MCNC: 12.3 G/DL (ref 11.5–15.4)
IMM GRANULOCYTES # BLD AUTO: 0.05 THOUSAND/UL (ref 0–0.2)
IMM GRANULOCYTES NFR BLD AUTO: 1 % (ref 0–2)
LYMPHOCYTES # BLD AUTO: 1.31 THOUSANDS/ΜL (ref 0.6–4.47)
LYMPHOCYTES NFR BLD AUTO: 13 % (ref 14–44)
MCH RBC QN AUTO: 31.5 PG (ref 26.8–34.3)
MCHC RBC AUTO-ENTMCNC: 31.5 G/DL (ref 31.4–37.4)
MCV RBC AUTO: 100 FL (ref 82–98)
MONOCYTES # BLD AUTO: 0.2 THOUSAND/ΜL (ref 0.17–1.22)
MONOCYTES NFR BLD AUTO: 2 % (ref 4–12)
NEUTROPHILS # BLD AUTO: 8.78 THOUSANDS/ΜL (ref 1.85–7.62)
NEUTS SEG NFR BLD AUTO: 84 % (ref 43–75)
NRBC BLD AUTO-RTO: 0 /100 WBCS
PLATELET # BLD AUTO: 310 THOUSANDS/UL (ref 149–390)
PMV BLD AUTO: 9.8 FL (ref 8.9–12.7)
POTASSIUM SERPL-SCNC: 3.4 MMOL/L (ref 3.5–5.3)
PROCALCITONIN SERPL-MCNC: <0.05 NG/ML
RBC # BLD AUTO: 3.91 MILLION/UL (ref 3.81–5.12)
SODIUM SERPL-SCNC: 143 MMOL/L (ref 136–145)
TROPONIN I SERPL-MCNC: <0.02 NG/ML
WBC # BLD AUTO: 10.35 THOUSAND/UL (ref 4.31–10.16)

## 2019-01-20 PROCEDURE — 80048 BASIC METABOLIC PNL TOTAL CA: CPT | Performed by: INTERNAL MEDICINE

## 2019-01-20 PROCEDURE — G8979 MOBILITY GOAL STATUS: HCPCS

## 2019-01-20 PROCEDURE — G8996 SWALLOW CURRENT STATUS: HCPCS

## 2019-01-20 PROCEDURE — 84484 ASSAY OF TROPONIN QUANT: CPT | Performed by: INTERNAL MEDICINE

## 2019-01-20 PROCEDURE — 85025 COMPLETE CBC W/AUTO DIFF WBC: CPT | Performed by: INTERNAL MEDICINE

## 2019-01-20 PROCEDURE — 94760 N-INVAS EAR/PLS OXIMETRY 1: CPT

## 2019-01-20 PROCEDURE — G8978 MOBILITY CURRENT STATUS: HCPCS

## 2019-01-20 PROCEDURE — 99222 1ST HOSP IP/OBS MODERATE 55: CPT | Performed by: INTERNAL MEDICINE

## 2019-01-20 PROCEDURE — G8997 SWALLOW GOAL STATUS: HCPCS

## 2019-01-20 PROCEDURE — 97163 PT EVAL HIGH COMPLEX 45 MIN: CPT

## 2019-01-20 PROCEDURE — 84145 PROCALCITONIN (PCT): CPT | Performed by: INTERNAL MEDICINE

## 2019-01-20 PROCEDURE — 92610 EVALUATE SWALLOWING FUNCTION: CPT

## 2019-01-20 PROCEDURE — 94640 AIRWAY INHALATION TREATMENT: CPT

## 2019-01-20 PROCEDURE — 99232 SBSQ HOSP IP/OBS MODERATE 35: CPT | Performed by: INTERNAL MEDICINE

## 2019-01-20 RX ORDER — POTASSIUM CHLORIDE 20 MEQ/1
20 TABLET, EXTENDED RELEASE ORAL ONCE
Status: COMPLETED | OUTPATIENT
Start: 2019-01-20 | End: 2019-01-20

## 2019-01-20 RX ORDER — AZITHROMYCIN 250 MG/1
250 TABLET, FILM COATED ORAL EVERY 24 HOURS
Status: DISCONTINUED | OUTPATIENT
Start: 2019-01-21 | End: 2019-01-22 | Stop reason: HOSPADM

## 2019-01-20 RX ADMIN — GABAPENTIN 200 MG: 100 CAPSULE ORAL at 08:25

## 2019-01-20 RX ADMIN — DILTIAZEM HYDROCHLORIDE 30 MG: 30 TABLET, FILM COATED ORAL at 22:59

## 2019-01-20 RX ADMIN — METOPROLOL TARTRATE 75 MG: 50 TABLET, FILM COATED ORAL at 21:23

## 2019-01-20 RX ADMIN — POTASSIUM CHLORIDE 20 MEQ: 1500 TABLET, EXTENDED RELEASE ORAL at 09:38

## 2019-01-20 RX ADMIN — DILTIAZEM HYDROCHLORIDE 30 MG: 30 TABLET, FILM COATED ORAL at 05:22

## 2019-01-20 RX ADMIN — METOPROLOL TARTRATE 75 MG: 50 TABLET, FILM COATED ORAL at 08:26

## 2019-01-20 RX ADMIN — METHYLPREDNISOLONE SODIUM SUCCINATE 40 MG: 40 INJECTION, POWDER, FOR SOLUTION INTRAMUSCULAR; INTRAVENOUS at 13:57

## 2019-01-20 RX ADMIN — IPRATROPIUM BROMIDE AND ALBUTEROL SULFATE 3 ML: 2.5; .5 SOLUTION RESPIRATORY (INHALATION) at 07:30

## 2019-01-20 RX ADMIN — IPRATROPIUM BROMIDE AND ALBUTEROL SULFATE 3 ML: 2.5; .5 SOLUTION RESPIRATORY (INHALATION) at 20:31

## 2019-01-20 RX ADMIN — ATORVASTATIN CALCIUM 20 MG: 20 TABLET, FILM COATED ORAL at 16:36

## 2019-01-20 RX ADMIN — IPRATROPIUM BROMIDE AND ALBUTEROL SULFATE 3 ML: 2.5; .5 SOLUTION RESPIRATORY (INHALATION) at 14:41

## 2019-01-20 RX ADMIN — METRONIDAZOLE 500 MG: 500 INJECTION, SOLUTION INTRAVENOUS at 07:21

## 2019-01-20 RX ADMIN — HEPARIN SODIUM 5000 UNITS: 5000 INJECTION, SOLUTION INTRAVENOUS; SUBCUTANEOUS at 21:25

## 2019-01-20 RX ADMIN — METHYLPREDNISOLONE SODIUM SUCCINATE 40 MG: 40 INJECTION, POWDER, FOR SOLUTION INTRAMUSCULAR; INTRAVENOUS at 22:59

## 2019-01-20 RX ADMIN — ACETAMINOPHEN 325 MG: 325 TABLET, FILM COATED ORAL at 08:26

## 2019-01-20 RX ADMIN — DILTIAZEM HYDROCHLORIDE 30 MG: 30 TABLET, FILM COATED ORAL at 16:36

## 2019-01-20 RX ADMIN — WHITE PETROLATUM 57.7 %-MINERAL OIL 31.9 % EYE OINTMENT 1 APPLICATION: at 21:26

## 2019-01-20 RX ADMIN — HEPARIN SODIUM 5000 UNITS: 5000 INJECTION, SOLUTION INTRAVENOUS; SUBCUTANEOUS at 13:57

## 2019-01-20 RX ADMIN — HEPARIN SODIUM 5000 UNITS: 5000 INJECTION, SOLUTION INTRAVENOUS; SUBCUTANEOUS at 05:22

## 2019-01-20 RX ADMIN — AZITHROMYCIN MONOHYDRATE 500 MG: 500 INJECTION, POWDER, LYOPHILIZED, FOR SOLUTION INTRAVENOUS at 12:24

## 2019-01-20 RX ADMIN — DOCUSATE SODIUM 200 MG: 100 CAPSULE, LIQUID FILLED ORAL at 08:25

## 2019-01-20 RX ADMIN — ASPIRIN 81 MG 81 MG: 81 TABLET ORAL at 08:26

## 2019-01-20 RX ADMIN — METHYLPREDNISOLONE SODIUM SUCCINATE 40 MG: 40 INJECTION, POWDER, FOR SOLUTION INTRAMUSCULAR; INTRAVENOUS at 05:22

## 2019-01-20 RX ADMIN — DILTIAZEM HYDROCHLORIDE 30 MG: 30 TABLET, FILM COATED ORAL at 10:23

## 2019-01-20 RX ADMIN — DONEPEZIL HYDROCHLORIDE 10 MG: 5 TABLET ORAL at 21:25

## 2019-01-20 RX ADMIN — GABAPENTIN 200 MG: 100 CAPSULE ORAL at 17:28

## 2019-01-20 NOTE — PHYSICAL THERAPY NOTE
PT EVALUATION   01/20/19 1410   Pain Assessment   Pain Assessment No/denies pain   Home Living   Type of Home SNF  [facility])   [address] [facility]   Additional Comments patient WC/bed bound prior to admission and non ambulatory, as per documentation, daughter completes stand pivot transfers at [facility]   Prior Function   Level of [place] Needs assistance with ADLs and functional mobility   Lives With Facility staff   Receives Help From Personal care attendant   ADL Assistance Needs assistance   IADLs Needs assistance   Falls in the last 6 months 1 to 4   Restrictions/Precautions   Other Precautions Chair Alarm; Bed Alarm; Fall Risk  (R hemiplegia, expressive aphasia)   General   Additional Pertinent History chart reviewed, patient admitted with COPD, SOB hypoxia from SNF  Patient with history of CVA with R hemiplegia and nonambulatory but able to feed self with set up as per transfer form    PT to attempt trial of PT to determine benefit for improving sitting balance and sitting tolerance for improved function and ADL status   Family/Caregiver Present No   Cognition   Overall Cognitive Status Unable to assess  (expressively aphasic)   Arousal/Participation Cooperative   Attention Attends with cues to redirect   Orientation Level Oriented to person   Following Commands Follows one step commands inconsistently   RLE Assessment   RLE Assessment (ROM limited/foot plantar flexed, old CVA/hemiparetic)   LLE Assessment   LLE Assessment (ROMWFL, strength 3+/4-)   Coordination   Movements are Fluid and Coordinated 0   Bed Mobility   Supine to Sit 1  Dependent   Additional items Assist x 1   Sit to Supine 1  Dependent   Additional items Assist x 1   Transfers   Sit to Stand 1  Dependent   Additional items Assist x 1;Verbal cues   Stand to Sit 1  Dependent   Additional items Assist x 1;Verbal cues   Stand pivot 1  Dependent   Additional items Assist x 1   Ambulation/Elevation   Gait Assistance Not tested  (nonambulatory)   Balance   Static Sitting Zero   Dynamic Sitting (zero)   Static Standing Zero   Dynamic Standing (zero)   Ambulatory (unable)   Activity Tolerance   Activity Tolerance Patient limited by fatigue  (limited by R hemparesis)   Nurse Made Aware yes   Assessment   Prognosis Fair   Problem List Decreased strength;Decreased range of motion;Decreased endurance; Impaired balance;Decreased mobility; Decreased coordination  (R hemiparesis and expressive aphasia)   Assessment Patient seen for Physical Therapy evaluation  Patient admitted with Chronic obstructive pulmonary disease with acute exacerbation ([facility] [address] )  Comorbidities affecting patient's physical performance include:dementia, hypertension, dyslipidemia, COPD, right upper lobe mass, left MCA CVA with right-sided weakness, gout, arthritis who presents with episode of shortness of breath wheezing and hypoxia, fall and gait dysfunction  Personal factors affecting patient at time of initial evaluation include: inability to ambulate household distances, inability to navigate community distances, inability to navigate level surfaces without external assistance, inability to perform dynamic tasks in community, positive fall history, inability to perform physical activity, inability to perform ADLS, inability to perform IADLS  and inability to live alone  Prior to admission, patient was dependent for mobility, requiring assist for ADLS, requiring assist for IADLS, a resident of long term care and having 24 hour care   Please find objective findings from Physical Therapy assessment regarding body systems outlined above with impairments and limitations including weakness, decreased ROM, impaired balance, decreased endurance, impaired coordination, gait deviations, decreased activity tolerance, decreased functional mobility tolerance and fall risk    The Barthel Index was used as a functional outcome tool presenting with a score of 15 today indicating marked limitations of functional mobility and ADLS  Patient's clinical presentation is currently unstable/unpredictable as seen in patient's presentation of vital sign response, changing level of pain, varying levels of cognitive performance, increased fall risk, new onset of impairment of functional mobility, decreased endurance and new onset of weakness  Pt would benefit from continued Physical Therapy treatment to address deficits as defined above and maximize level of functional mobility  As demonstrated by objective findings, the assigned level of complexity for this evaluation is high  Goals   Patient Goals none stated at this time, expressive aphasia   STG Expiration Date 01/27/19   Short Term Goal #1 increase sitting tolerance to up to one hour in supportive chair   Short Term Goal #2 increase sitting balance to poor on bed edge for improved function and ADLs status   LTG Expiration Date 02/03/19   Long Term Goal #1 increase OOB to chair tolerance to 2-3 hours as tolerated by patient   Plan   Treatment/Interventions ADL retraining;Functional transfer training;LE strengthening/ROM; Therapeutic exercise; Endurance training;Patient/family training;Bed mobility;Gait training;Equipment eval/education; Compensatory technique education   PT Frequency (3-5x/week)   Recommendation   Recommendation 24 hour supervision/assist  (return to SNF)   Barthel Index   Feeding 5   Bathing 0   Grooming Score 0   Dressing Score 0   Bladder Score 0   Bowels Score 0   Toilet Use Score 5   Transfers (Bed/Chair) Score 5   Mobility (Level Surface) Score 0   Stairs Score 0   Barthel Index Score 13   Licensure   [place] License Number  Latosha [facility] PT [id number]

## 2019-01-20 NOTE — PROGRESS NOTES
[facility] Medicine Progress Note  Patient: Luc Juarez 80 y o  female   MRN: [id number]  PCP: Shashank Mae MD  Unit/Bed#: [address] [place] [address] Encounter: [id number]  Date Of Visit: 01/20/19    Problem List:    Principal Problem:    Chronic obstructive pulmonary disease with acute exacerbation ([facility] [address] )  Active Problems:    Acute respiratory failure with hypoxia ([facility] [address] )    Lung mass    [facility]schemic stroke ([facility] )    UTI (urinary tract infection)    Paroxysmal atrial fibrillation (HCC)    Leg swelling    HTN (hypertension)    Dysphagia    Hypokalemia      Assessment & Plan:    Acute respiratory failure with hypoxia ([facility] [address] )   Assessment & Plan    Possibly related to above  Continue supplemental oxygen as tolerated  Prior imaging noted patient has significant centrilobular emphysematous changes  CT chest with advanced COPD  Pulmonary evaluation     * Chronic obstructive pulmonary disease with acute exacerbation (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  Currently at her baseline  Continue aspirin, statin  PT/OT/ST     Lung mass   Assessment & Plan    Noted in 2016, right upper lobe  Patient and family declined any further workup  Repeat CT with right upper lobe mass, smaller in size compared to prior     Leg swelling   Assessment & Plan    Follow-up venous Doppler     Paroxysmal atrial fibrillation (HCC)   Assessment & Plan    In sinus rhythm  Continue metoprolol and Cardizem  Prior records reviewed  Not a candidate for anticoagulation secondary to hemorrhagic conversion of ischemic CVA and fall risk     UTI (urinary tract infection)   Assessment & Plan    Recently diagnosed at last ER visit  Treated with Macrobid       Hypokalemia   Assessment & Plan    Replete and monitor     Dysphagia   Assessment & Plan    Secondary to CVA  On puree with honey thick liquid diet  Family reports intermittent cough with meal  Follow-up speech and swallow evaluation  Aspiration precaution     HTN (hypertension)   Assessment & Plan    Stable  Continue Cardizem and metoprolol           VTE Pharmacologic Prophylaxis:   Pharmacologic: Heparin  Mechanical VTE Prophylaxis in Place: Yes    Patient Centered Rounds: I have performed bedside rounds with nursing staff today  Discussions with Specialists or Other Care Team Provider: Yes    Education and Discussions with Family / Patient:Yes    Time Spent for Care: 45 minutes  More than 50% of total time spent on counseling and coordination of care as described above  Current Length of Stay: 1 day(s)    Current Patient Status: Inpatient     Discharge Plan:  Back to skilled nursing facility    Code Status: Level 3 - DNAR and DNI    Certification Statement: The patient will continue to require additional inpatient hospital stay due to COPD exacerbation secondary to bronchitis      Subjective: Tachypnea and tachycardia is improving  Still with wheezing and shortness of breath  Remains hypoxic on room air but poorly compliant with supplemental oxygen  Had bowel movement overnight    Objective:   Not in distress    Review of system is limited    Vitals:   Temp (24hrs), Av 1 °F (36 7 °C), Min:97 2 °F (36 2 °C), Max:98 7 °F (37 1 °C)    Temp:  [97 2 °F (36 2 °C)-98 7 °F (37 1 °C)] 98 4 °F (36 9 °C)  HR:  [] 78  Resp:  [20-29] 20  BP: (119-166)/(56-84) 124/76  SpO2:  [87 %-95 %] 91 %  Body mass index is 17 37 kg/m²  Input and Output Summary (last 24 hours):        Intake/Output Summary (Last 24 hours) at 01/20/19 1103  Last data filed at 01/20/19 0501   Gross per 24 hour   Intake              170 ml   Output              700 ml   Net             -530 ml       Physical Exam:     Physical Exam   Constitutional: She appears well-developed  Cachectic, chronically ill   HENT:   Head: Normocephalic and atraumatic  Nose: Nose normal    Eyes: Pupils are equal, round, and reactive to light  Conjunctivae are normal    Neck: Normal range of motion  Neck supple  Cardiovascular: Normal rate, regular rhythm and normal heart sounds  Pulmonary/Chest: Effort normal  No accessory muscle usage  No respiratory distress  She has decreased breath sounds  She has wheezes (Bilateral)  She has no rhonchi  She has no rales  Abdominal: Soft  Bowel sounds are normal  She exhibits no distension  There is no tenderness  There is no rebound and no guarding  Musculoskeletal: She exhibits edema (Right more than left)  Right upper extremity contracture  Diffuse arthritic changes   Neurological: She is alert  She displays atrophy  No cranial nerve deficit  She exhibits abnormal muscle tone  GCS eye subscore is 4  GCS verbal subscore is 4  GCS motor subscore is 6  Right-sided weakness   Skin: Skin is warm and dry  No rash noted  Psychiatric: Cognition and memory are impaired  Additional Data:     Labs:      Results from last 7 days  Lab Units 01/20/19  0530   WBC Thousand/uL 10 35*   HEMOGLOBIN g/dL 12 3   HEMATOCRIT % 39 1   PLATELETS Thousands/uL 310   NEUTROS PCT % 84*   LYMPHS PCT % 13*   MONOS PCT % 2*   EOS PCT % 0       Results from last 7 days  Lab Units 01/20/19  0530 01/19/19  0926   POTASSIUM mmol/L 3 4* 3 3*   CHLORIDE mmol/L 107 105   CO2 mmol/L 27 30   BUN mg/dL 22 24   CREATININE mg/dL 0 64 0 78   CALCIUM mg/dL 8 8 8 9   ALK PHOS U/L  --  78   ALT U/L  --  23   AST U/L  --  19       Results from last 7 days  Lab Units 01/19/19  0926   INR  0 97       * I Have Reviewed All Lab Data Listed Above    * Additional Pertinent Lab Tests Reviewed: All Labs For Current Hospital Admission Reviewed    Imaging:  Xr Chest 1 View Portable    Result Date: 1/13/2019  Narrative: CHEST INDICATION:   One view chest 1/2/2017  COMPARISON:  None EXAM PERFORMED/VIEWS:  XR CHEST PORTABLE FINDINGS: Normal cardiac silhouette  Aortic calcification is present  No airspace consolidation, pneumothorax, pulmonary edema, or pleural effusion  Previously noted right apical opacity not visualized on this radiograph  Dextroconvex thoracic and levoconvex lumbar scoliosis  Degenerative changes bilateral acromioclavicular and glenohumeral joints  Chronic bilateral rotator cuff tears  Impression: No radiographic evidence of acute intrathoracic process  Previously noted right apical opacity not visualized on this radiograph  Workstation performed: [id number]     Xr Humerus Left    Addendum Date: 1/19/2019 Addendum:   ADDENDUM: The posterior aspect of the distal humerus is not seen on the lateral study  As such, this is incompletely  Please correlate with the reading for the elbow study  This should include evaluation of the portion of humerus missing from this study  Result Date: 1/19/2019  Narrative: LEFT HUMERUS INDICATION:   Ecchymosis  COMPARISON:  None VIEWS:  XR HUMERUS LEFT Images: 2 FINDINGS: There is no acute fracture or dislocation  There is impressive degenerative change of the glenohumeral joint  The rotator cuff interval is quite narrowed  There is associated scalloping of the undersurface of the acromion  This would be consistent with a chronic rotator cuff tear  No lytic or blastic lesions are seen  Soft tissues are unremarkable  Impression: Osteoarthritis and chronic rotator cuff tear Workstation performed: [id number]     Xr Elbow 3+ Views Left    Result Date: 1/19/2019  Narrative: LEFT ELBOW INDICATION:   Ecchymosis    FALL   PAIN AND BRUIING ENTIRE DORSAL FOREARM COMPARISON:  None VIEWS:  XR ELBOW 3+ VW LEFT FINDINGS: There is no acute fracture or dislocation  There is no joint effusion  No degenerative changes  No lytic or blastic lesions are seen  Soft tissues are unremarkable  Impression: No acute osseous abnormality  Workstation performed: [id number]     Xr Forearm 2 Views Left    Result Date: 1/19/2019  Narrative: LEFT FOREARM INDICATION:   Ecchymosis  FALL   PAIN AND BRUISING ENTIRE DORSAL FOREARM COMPARISON:  None VIEWS:  XR FOREARM 2 VW LEFT FINDINGS: There is no acute fracture or dislocation  No degenerative changes  No lytic or blastic lesions are seen  Soft tissues are unremarkable  Impression: No acute osseous abnormality  Workstation performed: [id number]     Xr Hip/pelv 2-3 Vws Right    Result Date: 1/13/2019  Narrative: RIGHT HIP INDICATION:   hip pain  COMPARISON:  Right hip radiographs 5/22/2017 VIEWS:  XR HIP/PELV 2-3 VWS RIGHT W PELVIS IF PERFORMED FINDINGS: There is no acute fracture or dislocation  Mild right hip osteoarthritis is seen  Minimal chronic sclerosis and subchondral lucency right femoral head may be sequela of chronic osteoarthritis or AVN  No significant interval change  3 metallic screws in the right femoral head and neck  Left hip prosthesis is intact  No secondary signs of loosening or infection  No lytic or blastic osseous lesions  Heterotopic ossification adjacent to the left acetabulum and proximal femur  Bilateral greater trochanter enthesopathy  Degenerative changes pubic symphysis and visualized lower lumbar spine  Abundant stool in the rectal vault  Impression: No acute osseous abnormality  Degenerative changes as described  Abundant stool in the rectal vault  Workstation performed: [id number]     Ct Head Without Contrast    Result Date: 1/13/2019  Narrative: CT BRAIN - WITHOUT CONTRAST INDICATION:   trauma  COMPARISON:  CT of the head on May 22, 2017  TECHNIQUE:  CT examination of the brain was performed    In addition to axial images, coronal 2D reformatted images were created and submitted for interpretation  Radiation dose length product (DLP) for this visit:  1375 28 mGy-cm   This examination, like all CT scans performed in the [facility], was performed utilizing techniques to minimize radiation dose exposure, including the use of iterative reconstruction and automated exposure control  IMAGE QUALITY:  Diagnostic  FINDINGS: PARENCHYMA: Decreased attenuation is noted in periventricular and subcortical white matter demonstrating an appearance that is statistically most likely to represent moderate microangiopathic change  Redemonstrated right basal ganglia chronic lacunar infarcts  Redemonstrated chronic left middle cerebral artery territory infarction  No CT signs of acute infarction  No intracranial mass, mass effect or midline shift  No acute parenchymal hemorrhage  VENTRICLES AND EXTRA-AXIAL SPACES:  Normal for the patient's age  VISUALIZED ORBITS AND PARANASAL SINUSES:  Small mucous retention cyst in the left maxillary sinus  CALVARIUM AND EXTRACRANIAL SOFT TISSUES:  Normal      Impression: 1  No intracranial hemorrhage or calvarial fracture  2   Chronic small vessel ischemic changes  3   Redemonstrated chronic left middle cerebral artery territory infarction  Workstation performed: [id number]     Ct Chest Wo Contrast    Result Date: 1/19/2019  Narrative: CT CHEST WITHOUT IV CONTRAST INDICATION:   Shortness of breath Lung mass  COMPARISON:  12/20/2016  TECHNIQUE: CT examination of the chest was performed without intravenous contrast   Axial, sagittal, and coronal 2D reformatted images were created from the source data and submitted for interpretation  Radiation dose length product (DLP) for this visit:  149 04 mGy-cm     This examination, like all CT scans performed in the [facility], was performed utilizing techniques to minimize radiation dose exposure, including the use of iterative  reconstruction and automated exposure control  FINDINGS: LUNGS:  There has been decrease in size of the previously seen right apical mass, now measuring approximately to approximately 1 3 x 1 3 cm on image 3/21 (previously 3 1 x 2 4 cm)  There is minimal bibasilar compressive atelectasis  Advanced centrilobular emphysema bilaterally  There is no tracheal or endobronchial lesion  PLEURA:  Small bilateral pleural effusions, right greater than left  HEART/GREAT VESSELS:  Normal heart size  Coronary artery calcifications noted  Normal caliber thoracic aorta with diffuse atherosclerotic calcifications  MEDIASTINUM AND DANIELLA:  Unremarkable  CHEST WALL AND LOWER NECK:   Unremarkable  VISUALIZED STRUCTURES IN THE UPPER ABDOMEN:  Status post cholecystectomy  OSSEOUS STRUCTURES:  No acute fracture or destructive osseous lesion  Impression: Small bilateral pleural effusions with mild bibasilar compressive atelectasis  Diminished size of the right apical mass  Advanced COPD  Workstation performed: [id number]     Ct Cervical Spine Without Contrast    Result Date: 1/13/2019  Narrative: CT CERVICAL SPINE - WITHOUT CONTRAST INDICATION:   trauma  COMPARISON:  CT of the chest on December 20, 2016  TECHNIQUE:  CT examination of the cervical spine was performed without intravenous contrast   Contiguous axial images were obtained  Sagittal and coronal reconstructions were performed  Radiation dose length product (DLP) for this visit:  320 mGy-cm   This examination, like all CT scans performed in the [facility], was performed utilizing techniques to minimize radiation dose exposure, including the use of iterative reconstruction and automated exposure control  IMAGE QUALITY:  Diagnostic  FINDINGS: ALIGNMENT:  Normal alignment of the cervical spine  Grade 1 anterolisthesis of C3 on C4 likely degenerative    VERTEBRAL BODIES:  No acute fracture  DEGENERATIVE CHANGES:  Moderate multilevel cervical degenerative changes are noted   No critical central canal stenosis  Degenerative changes are most pronounced at C5-C6 and C6-C7 where there is mild bony spinal canal and moderate neural foraminal narrowing  PREVERTEBRAL AND PARASPINAL SOFT TISSUES:  Unremarkable  THORACIC INLET:  1 2 x 0 9 cm mass in the right upper lobe decreased in size since 12/20/2016  Impression: No cervical spine fracture or traumatic malalignment  Workstation performed: [id number]     Xr Chest 1 View    Result Date: 1/19/2019  Narrative: CHEST INDICATION:   Shortness of breath  History of COPD  Status post fall    COMPARISON:  1/13/2019 EXAM PERFORMED/VIEWS:  XR CHEST 1 VIEW FINDINGS: Heart shadow appears unremarkable  Atherosclerotic vascular calcifications are noted  The lungs are clear  No pneumothorax or pleural effusion  Osseous structures are stable with moderate to severe S-shaped scoliosis, centered at the thoracolumbar junction  Advanced degenerative changes of both shoulders noted  Impression: No acute cardiopulmonary disease   Workstation performed: [id number]     Imaging Reports Reviewed by myself    Cultures:   Blood Culture: No results found for: BLOODCX  Urine Culture:   Lab Results   Component Value Date    URINECX >100,000 cfu/ml Staphylococcus coagulase negative 01/11/2017    URINECX 50,000-59,000 cfu/ml Mixed Contaminants X2 01/11/2017    URINECX <10,000 cfu/ml Mixed Contaminants X2 01/02/2017    URINECX No Growth <1000 cfu/mL 01/02/2017    URINECX No Growth <1000 cfu/mL 12/25/2016     Sputum Culture: No components found for: SPUTUMCX  Wound Culture: No results found for: WOUNDCULT    Last 24 Hours Medication List:     Current Facility-Administered Medications:  acetaminophen 325 mg Oral Daily Bob Tobar MD    acetaminophen 650 mg Oral Q6H PRN oBb Tobar MD    artificial tear 1 application Both Eyes HS Bob Tobar MD    aspirin 81 mg Oral Daily Bob Tobar MD    atorvastatin 20 mg Oral Daily With [address], MD cefTRIAXone 1,000 mg Intravenous Q24H Brooke Alexander MD    And        azithromycin 500 mg Intravenous Q24H Brooke Alexander MD    diltiazem 30 mg Oral Q6H Brooke Alexander MD    docusate sodium 200 mg Oral Daily Brooke Alexander MD    donepezil 10 mg Oral HS Brooke Alexander MD    gabapentin 200 mg Oral BID Brooke Alexander MD    heparin (porcine) 5,000 Units Subcutaneous [facility] Brooek Alexander MD    HYDROcodone-acetaminophen 1 tablet Oral Q6H PRN Brooke Alexander MD    ipratropium-albuterol 3 mL Nebulization Q6H Brooke Alexander MD    ipratropium-albuterol 3 mL Nebulization Q4H PRN Brooke Alexander MD    methylPREDNISolone sodium succinate 40 mg Intravenous Q8H Brooke Alexander MD    metoprolol tartrate 75 mg Oral Q12H Brooke Alexander MD    metroNIDAZOLE 500 mg Intravenous Q8H Brooke Alexander MD Last Rate: 500 mg (01/20/19 0721)   ondansetron 4 mg Intravenous Q8H PRN Brooke Alexander MD    polyethylene glycol 17 g Oral Daily PRN Brooke Alexander MD         Today, Patient Was Seen By: Brooke Alexander MD    ** Please Note: "This note has been constructed using a voice recognition system  Therefore there may be syntax, spelling, and/or grammatical errors   Please call if you have any questions  "**

## 2019-01-20 NOTE — ASSESSMENT & PLAN NOTE
Lower extremity venous Doppler showed chronic DVT in the right posterior tibial, peroneal and soleal veins

## 2019-01-20 NOTE — PLAN OF CARE
DISCHARGE PLANNING     Discharge to home or other facility with appropriate resources Progressing        Knowledge Deficit     Patient/family/caregiver demonstrates understanding of disease process, treatment plan, medications, and discharge instructions Progressing        Nutrition/Hydration-ADULT     Nutrient/Hydration intake appropriate for improving, restoring or maintaining nutritional needs Progressing        Potential for Falls     Patient will remain free of falls Progressing        Prexisting or High Potential for Compromised Skin Integrity     Skin integrity is maintained or improved Progressing        RESPIRATORY - ADULT     Achieves optimal ventilation and oxygenation Progressing

## 2019-01-20 NOTE — DISCHARGE INSTR - DIET
Dysphagia 1/Pureed diet and Honey (moderately) thick liquid  Add ~1 ½ TBL thickener to 4ozs thin liquid

## 2019-01-20 NOTE — NURSING NOTE
Patient pulling off O2 nasal cannula, telemetry and foot boots  Redirected with little effect  Spo2 between 94-95% ra   Will monitor

## 2019-01-20 NOTE — CONSULTS
Consultation - Pulmonary Medicine   Rissa Brian 80 y o  female MRN: [id number]  Unit/Bed#: [address] [place] [address] Encounter: [id number]      Assessment:  Acute hypoxemic respiratory insufficiency  COPD unknown severity with mild acute exacerbation  Acute tracheobronchitis  Bilateral extensive emphysema  Abnormal CT of the chest  History of dysphagia    Plan:   Currently patient is saturating well on 2 L of oxygen by nasal cannula to continue to maintain saturation greater than 91%  Patient has extensive bilateral emphysema with a CT of the chest, no PFTs for severe COPD, mild acute exacerbation  Will continue with levalbuterol and ipratropium via the nebulizer  Solu-Medrol 40 mg every 8 hours, Can decrease it to Solu-Medrol 40 mg every 12 hours in a m  Continue with azithromycin for a total duration of 5 days for the acute tracheobronchitis, DC ceftriaxone no evidence of any pneumonia on the CT of the chest  No leukocytosis  Continue with a repeat clearance measures  Abnormal CT of the chest,Right upper lobe opacity, likely scar, decreased in size from the prior CT 2 years ago  No evidence of any pneumonia  History of dysphagia, on aspiration precautions speech and swallow to evaluate in the a m    Thank you for the consultation continue to follow    History of Present Illness   Physician Requesting Consult: Autumn Marquis MD  Reason for Consult / Principal Problem: hypoxemia  Hx and PE limited by: dementia, history obtained from chart   HPI: Rissa Brian is a 80y o  year old female who With history of COPD, extensive emphysema on the CT of the chest history of right upper lobe lung mass in 2016, patient did not want further evaluation the PET scan then, also has history of CVA, history of dysphagia, currently  Transfer from the nursing facility for shortness of breath and wheezing, found to be mildly hypoxemic in the ER, CT of the chest demonstrating extensive bilateral emphysema no evidence of any pneumonia, right upper lobe lung mass seen 2 years ago slightly decreased in size from before, currently being treated from COPD exacerbation    Inpatient consult to Pulmonology  Consult performed by: Mau Bonilla 45 ordered by: Janee Ham          Review of Systems   Unable to perform ROS: Dementia       Historical Information   Past Medical History:   Diagnosis Date    Arthritis     [facility]     Dementia     Emphyse[facility] lung [facility]     Gout     Gout     Hyperlipidemia     Hypertension      Past Surgical History:   Procedure Laterality Date    CATARACT EXTRACTION W/  INTRAOCULAR LENS IMPLANT Bilateral     CHOLECYSTECTOMY      ELBOW FRACTURE SURGERY      HYSTERECTOMY      JOINT REPLACEMENT Bilateral     Had bilateral hip surgery    One was repaired secondary to fracture other was replaced due to DJD     Social History   History   Alcohol Use No     History   Drug Use No     History   Smoking Status    Former Smoker    Packs/day: 1 00    Years: 25 00    Types: Cigarettes    Quit date: 1976   Smokeless Tobacco    Never Used     Comment: Patient former smoker, quit 1976         Family History: non-contributory    Meds/Allergies   current meds:   Current Facility-Administered Medications   Medication Dose Route Frequency    acetaminophen (TYLENOL) tablet 325 mg  325 mg Oral Daily    acetaminophen (TYLENOL) tablet 650 mg  650 mg Oral Q6H PRN    artificial tear (LUBRIFRESH P M ) ophthalmic ointment 1 application  1 application Both Eyes HS    aspirin chewable tablet 81 mg  81 mg Oral Daily    atorvastatin (LIPITOR) tablet 20 mg  20 mg Oral Daily With Dinner    [START ON 1/21/2019] azithromycin (ZITHROMAX) tablet 250 mg  250 mg Oral Q24H    diltiazem (CARDIZEM) tablet 30 mg  30 mg Oral Q6H    docusate sodium (COLACE) capsule 200 mg  200 mg Oral Daily    donepezil (ARICEPT) tablet 10 mg  10 mg Oral HS    gabapentin (NEURONTIN) capsule 200 mg  200 mg Oral BID    heparin (porcine) subcutaneous injection 5,000 Units  5,000 Units Subcutaneous Q8H [facility]    HYDROcodone-acetaminophen (NORCO) 5-325 mg per tablet 1 tablet  1 tablet Oral Q6H PRN    ipratropium-albuterol (DUO-NEB) 0 5-2 5 mg/3 mL inhalation solution 3 mL  3 mL Nebulization Q6H    ipratropium-albuterol (DUO-NEB) 0 5-2 5 mg/3 mL inhalation solution 3 mL  3 mL Nebulization Q4H PRN    methylPREDNISolone sodium succinate (Solu-MEDROL) injection 40 mg  40 mg Intravenous Q8H    metoprolol tartrate (LOPRESSOR) tablet 75 mg  75 mg Oral Q12H    ondansetron (ZOFRAN) injection 4 mg  4 mg Intravenous Q8H PRN    polyethylene glycol (MIRALAX) packet 17 g  17 g Oral Daily PRN       Allergies   Allergen Reactions    Sulfa Antibiotics        Objective   Vitals: Blood pressure 136/55, pulse 82, temperature 98 2 °F (36 8 °C), temperature source Tympanic, resp  rate 20, height 5' 4" (1 626 m), weight 45 9 kg (101 lb 3 1 oz), SpO2 92 %, not currently breastfeeding  ,Body mass index is 17 37 kg/m²  Intake/Output Summary (Last 24 hours) at 01/20/19 1809  Last data filed at 01/20/19 1355   Gross per 24 hour   Intake              380 ml   Output              800 ml   Net             -420 ml     Invasive Devices     Peripheral Intravenous Line            Peripheral IV 01/20/19 Right Hand less than 1 day          Drain            Urethral Catheter 16 Fr  747 days                Physical Exam   Constitutional: She appears well-developed and well-nourished  HENT:   Head: Normocephalic and atraumatic  Eyes: Pupils are equal, round, and reactive to light  Conjunctivae are normal    Neck: Normal range of motion  Neck supple  No JVD present  No thyromegaly present  Cardiovascular: Normal rate, regular rhythm and normal heart sounds  Exam reveals no gallop and no friction rub  No murmur heard  Pulmonary/Chest: No respiratory distress  She has wheezes  She has no rales  She exhibits no tenderness     Diminished breath sounds bilaterally, occasional expiratory rhonchi bilaterally   Abdominal: Soft  Bowel sounds are normal    Musculoskeletal: Normal range of motion  She exhibits no edema, tenderness or deformity  Lymphadenopathy:     She has no cervical adenopathy  Neurological: She is alert  Skin: Skin is warm and dry  Psychiatric: She has a normal mood and affect  Nursing note and vitals reviewed  Lab Results:   CBC:   Lab Results   Component Value Date    WBC 10 35 (H) 01/20/2019    HGB 12 3 01/20/2019    HCT 39 1 01/20/2019     (H) 01/20/2019     01/20/2019    MCH 31 5 01/20/2019    MCHC 31 5 01/20/2019    RDW 11 9 01/20/2019    MPV 9 8 01/20/2019    NRBC 0 01/20/2019     Imaging Studies: I have personally reviewed pertinent films in PACS  EKG, Pathology, and Other Studies: I have personally reviewed pertinent reports      VTE Prophylaxis: Sequential compression device Rose Oscar)     Code Status: Level 3 - DNAR and DNI  Advance Directive and Living Will:      Power of :    POLST:

## 2019-01-20 NOTE — SPEECH THERAPY NOTE
Speech-Language Pathology Bedside Swallow Evaluation        Patient Name: Valdemar PENNY Date: 1/20/2019     Problem List  Patient Active Problem List   Diagnosis    Chronic obstructive pulmonary disease with acute exacerbation (HCC)    UTI (urinary tract infection)    HTN (hypertension)    Lung mass    [facility] [address] [facility]    Dysphagia    Dehydration    Dementia    Paroxysmal atrial fibrillation (HCC)    Hematuria    Acute respiratory failure with hypoxia (HCC)    Hypokalemia    Leg swelling       Past Medical History  Past Medical History:   Diagnosis Date    Arthritis     [facility]) [facility] [address] [facility]     Dementia     Emphysema lung [facility] [facility]     Gout     Gout     Hyperlipidemia     Hypertension        Past Surgical History  Past Surgical History:   Procedure Laterality Date    CATARACT EXTRACTION W/  INTRAOCULAR LENS IMPLANT Bilateral     CHOLECYSTECTOMY      ELBOW FRACTURE SURGERY      HYSTERECTOMY      JOINT REPLACEMENT Bilateral     Had bilateral hip surgery  One was repaired secondary to fracture other was replaced due to DJD         Current Medical Status  Pt is a 80 y o  female who presented to [facility]  with shortness of breath wheezing and hypoxia  On presentation to the emergency room patient was afebrile but noted to be tachypneic tachycardic and saturating 86% on room air and was placed on 2 L nasal cannula  Per chart SPO2 93% on 2 L nasal cannula oxygen  Chest x-ray did not reveal any acute abnormalities  Patient is know to this department from previous admission in dec 2016- Jan 2017- she was last seen on 1/10/2017 and diet was puree and nectar thickened liquids at that time  Transfer forms from [facility] where she resides indicates a puree and honey thickened liquid diet  Per patient she was on nectar at SNF  Attempted to contact the patients daughter Pastor Mendoza to discuss her dysphagia/diet history however I was unable to reach her   Per Md note yesterday family denies recent cough but does report intermittent cough associated with oral intake  Past medical history:   Please see H&P for details    Special Studies:  1/19 Chest CT: Small bilateral pleural effusions with mild bibasilar compressive atelectasis  Diminished size of the right apical mass  Advanced COPD  1/19 CXR: No acute cardiopulmonary disease    Social/Education/Vocational Hx:  Pt lives in SNF/ECF    Swallow Information   Current Risks for Dysphagia & Aspiration: known history of dysphagia, dysarthria and reported new dysphagia     Current Symptoms/Concerns: coughing with po    Current Diet: puree/level 1 diet and honey thick liquids      Baseline Diet: puree/level 1 diet and honey thick liquids      Baseline Assessment   Behavior/Cognition: alert    Speech/Language Status: able to participate in basic conversation and able to follow commands inconsistently    Patient Positioning: upright in bed      Swallow Mechanism Exam   Facial: right facial droop  Labial: bilateral decreased ROM- unable to assess strength- likely reduced  Lingual: decreased strength right side  Velum: unable to visualize  Mandible: adequate ROM  Dentition: upper dentures- edentulous lower  Vocal quality:clear/adequate   Volitional Cough: fair   Respiratory: 2L via NC  Swallow Mechanics:  Mildly delayed and mildly reduced HLE upon dry swallow     Consistencies Assessed and Performance   Consistencies Administered: nectar thick (6oz), honey thick (2oz) and puree  Specific materials administered included: pureed beef with gravy (50% ), mashed potatoes (10%) and applesauce ( 3oz)- thickened liquids were administered via single and consecutive cup sips  Patient was seen with lunch tray and self fed  Oral Stage:   Patient with good oral acceptance and spoon stripping  Mild anterior spill on the right side with puree at times- this was not observed with liquids    Bolus formation and transfer were adequate with the materials administered today with no significant oral residue noted  No overt s/s reduced oral control  Pharyngeal Stage:  Swallowing initiation appeared mildly delayed  Laryngeal rise was palpated and judged to be mildly reduced  No coughing, throat clearing, change in vocal quality or respiratory status noted today  Esophageal Concerns: none reported    Summary   Pts oropharyngeal swallow function appears generally WFL at this time with the materials administered today  Upgraded trials of soft solids and thin liquids were not assessed as patients baseline is puree and honey thick liquids- will plan to further assess potential for return to puree/NTL with continued assessment and after speaking with family given reported new s/sx which were not observed today  Patient with good prognosis for advancement to at least nectar thick liquids in the next few days  She does not appear to be at risk for aspiration with her current diet        Recommendations: puree/level 1 diet and honey thick liquids     Recommended Form of Meds: crushed with puree     Aspiration precautions and compensatory swallowing strategies: upright posture, only feed when fully alert, slow rate of feeding, small bites/sips and alternating bites and sips    Results Reviewed with: patient, RN and MD     Treatment Recommendations: ST will continue to follow     Dysphagia Goals: pt will tolerate puree textures with nectar thick liquids without s/s of aspiration x3    GONSALO Kruse S , [address] [place] [address] Language Pathologist   [phone]

## 2019-01-20 NOTE — PLAN OF CARE
Problem: SLP ADULT - SWALLOWING, IMPAIRED  Goal: Initial SLP swallow eval performed  Outcome: Completed Date Met: 01/20/19

## 2019-01-20 NOTE — UTILIZATION REVIEW
Initial Clinical Review    Admission: Date/Time/Statement: 1/19/19 @ 1146     Orders Placed This Encounter   Procedures    Inpatient Admission (expected length of stay for this patient is greater than two midnights)     Standing Status:   Standing     Number of Occurrences:   1     Order Specific Question:   Admitting Physician     Answer:   Juliann Mendez     Order Specific Question:   Level of Care     Answer:   Med Surg [16]     Order Specific Question:   Estimated length of stay     Answer:   More than 2 Midnights     Order Specific Question:   Certification     Answer:   I certify that inpatient services are medically necessary for this patient for a duration of greater than two midnights  See H&P and MD Progress Notes for additional information about the patient's course of treatment  ED: Date/Time/Mode of Arrival:   ED Arrival Information     Expected Arrival Acuity Means of Arrival Escorted By Service Admission Type    - 1/19/2019 08:51 [facility] Emergency    Arrival Complaint    Shortness of breath        Chief Complaint:   Chief Complaint   Patient presents with    Shortness of Breath     Pt arrived via squad from nursing home  Started at 0600 with SOB  Received neb at [facility]  Arrived awake and alert in no acute distress     History of Illness    80year old female presents to ed from nursing home for evaluation of dyspnea, wheezing and hypoxia        ED Vital Signs:   01/19/19 0858 01/19/19 0858 01/19/19 0858 01/19/19 0858 01/19/19 0858   97 8 °F (36 6 °C) 86 18 157/73 93 %      No Pain       01/20/19 45 9 kg (101 lb 3 1 oz)     Vital Signs (abnormal):       Heart rate  119  114   114  Respiratory rate   30  29  29  24   29  24  23     86% ra    92% on 4L  O2nc       Pertinent Labs/Diagnostic Test Results:    She has decreased breath sounds   She has wheezes in the right upper field and the right middle field    WBC Thousand/uL 10 46*     Chest x ray  No acute finding     CT chest    Small bilateral pleural effusions with mild bibasilar compressive atelectasis    Diminished size of the right apical mass    Advanced COPD      ABG   PH 7 497  PCO2  37 8   PO2  52 8   HCO3  22 8        ED Treatment:   Medication Administration from 01/19/2019 0851 to 01/19/2019 1314       Date/Time Order Dose Route     01/19/2019 0935 sodium chloride 0 9 % bolus 1,000 mL 1,000 mL Intravenous     01/19/2019 0926 albuterol inhalation solution 5 mg 5 mg Nebulization     01/19/2019 0926 ipratropium (ATROVENT) 0 02 % inhalation solution 0 5 mg 0 5 mg Nebulization     01/19/2019 0926 methylPREDNISolone sodium succinate (Solu-MEDROL) injection 60 mg 60 mg Intravenous     01/19/2019 1224 cefTRIAXone (ROCEPHIN) IVPB (premix) 1,000 mg 1,000 mg Intravenous     01/19/2019 1254 azithromycin (ZITHROMAX) 500 mg in sodium chloride 0 9% 250mL IVPB 500 mg 500 mg Intravenous     Past Medical/Surgical History:     Diagnosis    Chronic obstructive pulmonary disease with acute exacerbation (HCC)    UTI (urinary tract infection)    HTN (hypertension)    Lung mass    Ischemic stroke (HCC)    Dysphagia    Dehydration    Dementia    Paroxysmal atrial fibrillation (HCC)    Hematuria         Admitting Diagnosis:     Shortness of breath [R06 02]  [facility]D [facility]chronic obstructive [facility] disease) ([facility] ) [J44 9]  Hypoxia [R09 02]      Age/Sex: 80 y o  female       Assessment/Plan:      Acute respiratory failure with hypoxia (HCC)   Assessment & Plan     Possibly related to above  Continue supplemental oxygen as needed  Prior imaging noted patient has significant centrilobular emphysematous changes  Check CT chest  Pulmonary evaluation      * Chronic obstructive pulmonary disease with acute exacerbation (HCC)   Assessment & Plan     Secondary to acute bronchitis versus early pneumonia  Presented with cough shortness of breath, wheezing and leukocytosis  Concerns of aspiration secondary to dysphagia  Bronchodilators, IV steroids  IV azithromycin and Rocephin for now  Check urine Legionella pneumococcal antigen  Follow-up chest CT  Trend procalcitonin and deescalate accordingly  Aspiration precaution, chest PT  Pulmonary evaluation     Lung mass   Assessment & Plan     Noted in 2016, right upper lobe  Patient and family declined any further workup  Will get repeat chest CT to further evaluate           Admission Orders:      Consult pulmonology  PT OT and Speech eval and treat  Dysphagia diet   Telemetry  Peak flow  BLLE venous duplex    Procalcitonin  Strep pneumoniae  Legionella       acetaminophen 325 mg Oral Daily   acetaminophen 650 mg Oral Q6H PRN   artificial tear 1 application Both Eyes HS   aspirin 81 mg Oral Daily   atorvastatin 20 mg Oral Daily With Dinner   [START ON 1/21/2019] azithromycin 250 mg Oral Q24H   diltiazem 30 mg Oral Q6H   docusate sodium 200 mg Oral Daily   donepezil 10 mg Oral HS   gabapentin 200 mg Oral BID   heparin (porcine) 5,000 Units Subcutaneous Q8H [address]   HYDROcodone-acetaminophen 1 tablet Oral Q6H PRN   ipratropium-albuterol 3 mL Nebulization Q6H   ipratropium-albuterol 3 mL Nebulization Q4H PRN   methylPREDNISolone sodium succinate 40 mg Intravenous Q8H   metoprolol tartrate 75 mg Oral Q12H   ondansetron 4 mg Intravenous Q8H PRN   polyethylene glycol 17 g Oral Daily PRN

## 2019-01-21 ENCOUNTER — APPOINTMENT (INPATIENT)
Dept: RADIOLOGY | Facility: HOSPITAL | Age: 84
DRG: 189 | End: 2019-01-21
Payer: MEDICARE

## 2019-01-21 PROBLEM — D72.829 LEUKOCYTOSIS: Status: ACTIVE | Noted: 2019-01-21

## 2019-01-21 PROBLEM — E87.6 HYPOKALEMIA: Status: RESOLVED | Noted: 2019-01-19 | Resolved: 2019-01-21

## 2019-01-21 LAB
ANION GAP SERPL CALCULATED.3IONS-SCNC: 7 MMOL/L (ref 4–13)
ATRIAL RATE: 90 BPM
BASOPHILS # BLD AUTO: 0.01 THOUSANDS/ΜL (ref 0–0.1)
BASOPHILS NFR BLD AUTO: 0 % (ref 0–1)
BUN SERPL-MCNC: 26 MG/DL (ref 5–25)
CALCIUM SERPL-MCNC: 9.1 MG/DL (ref 8.3–10.1)
CHLORIDE SERPL-SCNC: 108 MMOL/L (ref 100–108)
CO2 SERPL-SCNC: 30 MMOL/L (ref 21–32)
CREAT SERPL-MCNC: 0.73 MG/DL (ref 0.6–1.3)
EOSINOPHIL # BLD AUTO: 0 THOUSAND/ΜL (ref 0–0.61)
EOSINOPHIL NFR BLD AUTO: 0 % (ref 0–6)
ERYTHROCYTE [DISTWIDTH] IN BLOOD BY AUTOMATED COUNT: 12.2 % (ref 11.6–15.1)
GFR SERPL CREATININE-BSD FRML MDRD: 76 ML/MIN/1.73SQ M
GLUCOSE SERPL-MCNC: 189 MG/DL (ref 65–140)
HCT VFR BLD AUTO: 40.9 % (ref 34.8–46.1)
HGB BLD-MCNC: 12.8 G/DL (ref 11.5–15.4)
IMM GRANULOCYTES # BLD AUTO: 0.12 THOUSAND/UL (ref 0–0.2)
IMM GRANULOCYTES NFR BLD AUTO: 1 % (ref 0–2)
LYMPHOCYTES # BLD AUTO: 1.11 THOUSANDS/ΜL (ref 0.6–4.47)
LYMPHOCYTES NFR BLD AUTO: 6 % (ref 14–44)
MCH RBC QN AUTO: 31.4 PG (ref 26.8–34.3)
MCHC RBC AUTO-ENTMCNC: 31.3 G/DL (ref 31.4–37.4)
MCV RBC AUTO: 100 FL (ref 82–98)
MONOCYTES # BLD AUTO: 0.4 THOUSAND/ΜL (ref 0.17–1.22)
MONOCYTES NFR BLD AUTO: 2 % (ref 4–12)
MRSA NOSE QL CULT: NORMAL
NEUTROPHILS # BLD AUTO: 16.06 THOUSANDS/ΜL (ref 1.85–7.62)
NEUTS SEG NFR BLD AUTO: 91 % (ref 43–75)
NRBC BLD AUTO-RTO: 0 /100 WBCS
P AXIS: 77 DEGREES
PLATELET # BLD AUTO: 365 THOUSANDS/UL (ref 149–390)
PMV BLD AUTO: 9.8 FL (ref 8.9–12.7)
POTASSIUM SERPL-SCNC: 3.5 MMOL/L (ref 3.5–5.3)
PR INTERVAL: 154 MS
QRS AXIS: 60 DEGREES
QRSD INTERVAL: 74 MS
QT INTERVAL: 372 MS
QTC INTERVAL: 455 MS
RBC # BLD AUTO: 4.08 MILLION/UL (ref 3.81–5.12)
SODIUM SERPL-SCNC: 145 MMOL/L (ref 136–145)
T WAVE AXIS: 52 DEGREES
VENTRICULAR RATE: 90 BPM
WBC # BLD AUTO: 17.7 THOUSAND/UL (ref 4.31–10.16)

## 2019-01-21 PROCEDURE — 93970 EXTREMITY STUDY: CPT

## 2019-01-21 PROCEDURE — 85025 COMPLETE CBC W/AUTO DIFF WBC: CPT | Performed by: INTERNAL MEDICINE

## 2019-01-21 PROCEDURE — 99232 SBSQ HOSP IP/OBS MODERATE 35: CPT | Performed by: INTERNAL MEDICINE

## 2019-01-21 PROCEDURE — 93010 ELECTROCARDIOGRAM REPORT: CPT | Performed by: INTERNAL MEDICINE

## 2019-01-21 PROCEDURE — 94760 N-INVAS EAR/PLS OXIMETRY 1: CPT

## 2019-01-21 PROCEDURE — 92526 ORAL FUNCTION THERAPY: CPT

## 2019-01-21 PROCEDURE — 94640 AIRWAY INHALATION TREATMENT: CPT

## 2019-01-21 PROCEDURE — 80048 BASIC METABOLIC PNL TOTAL CA: CPT | Performed by: INTERNAL MEDICINE

## 2019-01-21 RX ORDER — METHYLPREDNISOLONE SODIUM SUCCINATE 40 MG/ML
40 INJECTION, POWDER, LYOPHILIZED, FOR SOLUTION INTRAMUSCULAR; INTRAVENOUS EVERY 12 HOURS SCHEDULED
Status: DISCONTINUED | OUTPATIENT
Start: 2019-01-21 | End: 2019-01-22 | Stop reason: HOSPADM

## 2019-01-21 RX ORDER — POTASSIUM CHLORIDE 20 MEQ/1
20 TABLET, EXTENDED RELEASE ORAL ONCE
Status: COMPLETED | OUTPATIENT
Start: 2019-01-21 | End: 2019-01-21

## 2019-01-21 RX ADMIN — HEPARIN SODIUM 5000 UNITS: 5000 INJECTION, SOLUTION INTRAVENOUS; SUBCUTANEOUS at 05:44

## 2019-01-21 RX ADMIN — ASPIRIN 81 MG 81 MG: 81 TABLET ORAL at 09:17

## 2019-01-21 RX ADMIN — METOPROLOL TARTRATE 75 MG: 50 TABLET, FILM COATED ORAL at 21:24

## 2019-01-21 RX ADMIN — WHITE PETROLATUM 57.7 %-MINERAL OIL 31.9 % EYE OINTMENT 1 APPLICATION: at 21:25

## 2019-01-21 RX ADMIN — AZITHROMYCIN 250 MG: 250 TABLET, FILM COATED ORAL at 11:18

## 2019-01-21 RX ADMIN — DILTIAZEM HYDROCHLORIDE 30 MG: 30 TABLET, FILM COATED ORAL at 09:17

## 2019-01-21 RX ADMIN — IPRATROPIUM BROMIDE AND ALBUTEROL SULFATE 3 ML: 2.5; .5 SOLUTION RESPIRATORY (INHALATION) at 07:45

## 2019-01-21 RX ADMIN — METHYLPREDNISOLONE SODIUM SUCCINATE 40 MG: 40 INJECTION, POWDER, FOR SOLUTION INTRAMUSCULAR; INTRAVENOUS at 21:24

## 2019-01-21 RX ADMIN — ATORVASTATIN CALCIUM 20 MG: 20 TABLET, FILM COATED ORAL at 15:42

## 2019-01-21 RX ADMIN — DILTIAZEM HYDROCHLORIDE 30 MG: 30 TABLET, FILM COATED ORAL at 21:25

## 2019-01-21 RX ADMIN — IPRATROPIUM BROMIDE AND ALBUTEROL SULFATE 3 ML: 2.5; .5 SOLUTION RESPIRATORY (INHALATION) at 13:20

## 2019-01-21 RX ADMIN — HEPARIN SODIUM 5000 UNITS: 5000 INJECTION, SOLUTION INTRAVENOUS; SUBCUTANEOUS at 14:27

## 2019-01-21 RX ADMIN — HEPARIN SODIUM 5000 UNITS: 5000 INJECTION, SOLUTION INTRAVENOUS; SUBCUTANEOUS at 21:23

## 2019-01-21 RX ADMIN — METHYLPREDNISOLONE SODIUM SUCCINATE 40 MG: 40 INJECTION, POWDER, FOR SOLUTION INTRAMUSCULAR; INTRAVENOUS at 05:44

## 2019-01-21 RX ADMIN — GABAPENTIN 200 MG: 100 CAPSULE ORAL at 17:30

## 2019-01-21 RX ADMIN — METOPROLOL TARTRATE 75 MG: 50 TABLET, FILM COATED ORAL at 09:16

## 2019-01-21 RX ADMIN — GABAPENTIN 200 MG: 100 CAPSULE ORAL at 09:16

## 2019-01-21 RX ADMIN — ACETAMINOPHEN 325 MG: 325 TABLET, FILM COATED ORAL at 09:17

## 2019-01-21 RX ADMIN — DOCUSATE SODIUM 200 MG: 100 CAPSULE, LIQUID FILLED ORAL at 09:16

## 2019-01-21 RX ADMIN — IPRATROPIUM BROMIDE AND ALBUTEROL SULFATE 3 ML: 2.5; .5 SOLUTION RESPIRATORY (INHALATION) at 02:25

## 2019-01-21 RX ADMIN — DILTIAZEM HYDROCHLORIDE 30 MG: 30 TABLET, FILM COATED ORAL at 15:42

## 2019-01-21 RX ADMIN — POTASSIUM CHLORIDE 20 MEQ: 1500 TABLET, EXTENDED RELEASE ORAL at 11:18

## 2019-01-21 RX ADMIN — DONEPEZIL HYDROCHLORIDE 10 MG: 5 TABLET ORAL at 21:24

## 2019-01-21 RX ADMIN — IPRATROPIUM BROMIDE AND ALBUTEROL SULFATE 3 ML: 2.5; .5 SOLUTION RESPIRATORY (INHALATION) at 19:35

## 2019-01-21 RX ADMIN — DILTIAZEM HYDROCHLORIDE 30 MG: 30 TABLET, FILM COATED ORAL at 04:09

## 2019-01-21 NOTE — SPEECH THERAPY NOTE
Speech Language/Pathology    Speech/Language Pathology Progress Note    Patient Name: Jorge Tierney  [id number] Date: 1/21/2019     Subjective:  Patient was seen with lunch tray upright in bed- she self fed but only consumed approx 50% of her meal  I spoke with the patients daughter Jaquan Tinoco via telephone call for a portion of todays visit  Per RN and patients daughter she has been eating approx 50% of most meals but is agreeable to chocolate supplement- RD made aware (will likely be chocolate magic cup)  Per patients daughter she was receiving ground meat and honey thick liquids at [facility]  She also admitted to providing the patient with thin coffee at times stating "she did ok with it if she went slow " Patients daughter also indicated that patient is often times impulsive with eating which negatively impacts swallow safety  Objective:  Patient consumed puree textures and honey thick liquids with anterior spill of puree textures and generally slow yet functional bolus formation and transfer with minimal oral residue noted which she cleared with liquid wash  Swallowing initiation continues to be mildly delayed with mildly reduced HLE  No coughing, throat clearing, change in vocal quality or respiratory status noted today  Nectar thick liquids trials were also provided again today which revealed the same results above however an intermittent delayed cough  Assessment:  Patient continues to present with moderate oral and suspected moderate pharyngeal dysphagia with an increased risk for aspiration      Plan/Recommendations:  Continue puree and honey thick liquids  meds crushed with puree    Aspiration precautions and compensatory swallowing strategies: upright posture, only feed when fully alert, slow rate of feeding, small bites/sips and alternating bites and sips    Will continue to follow    GONSALO Muller S , [address] [facility] [place]  Speech Language Pathologist   [id number]

## 2019-01-21 NOTE — PROGRESS NOTES
[facility] Progress Note  Patient: Rissa Brian 80 y o  female   MRN: [id number]  PCP: Tomy Irizarry MD  Unit/Bed#: [address] [place] [address] Encounter: [id number]  Date Of Visit: 01/21/19    Problem List:    Principal Problem:    Chronic obstructive pulmonary disease with acute exacerbation ([facility] )  Active Problems:    Acute respiratory failure with hypoxia ([facility] )    Lung mass    Ischemic stroke ([facility] )    UTI (urinary tract infection)    Paroxysmal atrial fibrillation (HCC)    Leg swelling    HTN (hypertension)    Dysphagia    Leukocytosis      Assessment & Plan:    Acute respiratory failure with hypoxia ([facility] )   Assessment & Plan    Possibly related to above  Continue supplemental oxygen   Prior imaging noted patient has significant centrilobular emphysematous changes  CT chest with advanced COPD  Follow up with Pulmonary     * Chronic obstructive pulmonary disease with acute exacerbation ([facility] )   Assessment & Plan    Secondary to acute bronchitis   Presented with cough shortness of breath, wheezing and leukocytosis  Concerns of aspiration secondary to dysphagia  Improving clinically  Bronchodilators  Will decrease IV steroids at 40 mg 12 hours  Continue azithromycin, DCed Rocephin  Chest CT with changes of advanced COPD, no acute infiltrates  Trend procalcitonin   Continue supplemental oxygen as tolerated  Aspiration precaution, chest PT  Follow up with Pulmonary     Ischemic stroke [facility])   Assessment & Plan    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  Currently at her baseline  Continue aspirin, statin  PT/OT/ST     Lung mass   Assessment & Plan    Noted in 2016, right upper lobe  Patient and family declined any further workup  Repeat CT with right upper lobe mass, smaller in size compared to prior    Possibly scarring  Follow up with Pulmonary as outpatient     Leg swelling   Assessment & Plan    Follow-up venous Doppler     Paroxysmal atrial fibrillation [facility])   Assessment & Plan In sinus rhythm  Continue metoprolol and Cardizem  Prior records reviewed  Not a candidate for anticoagulation secondary to hemorrhagic conversion of ischemic CVA and fall risk     UTI (urinary tract infection)   Assessment & Plan    Recently diagnosed at last ER visit  Treated with Macrobid       Leukocytosis   Assessment & Plan    Secondary to steroids  Monitor     Dysphagia   Assessment & Plan    Secondary to CVA  On puree with honey thick liquid diet  Family reports intermittent cough with meal  Speech and swallow evaluation noted yesterday recommended to continue puree with honey thick  Re-evaluation later today  Aspiration precaution     HTN (hypertension)   Assessment & Plan    Stable  Continue Cardizem and metoprolol     Hypokalemiaresolved as of 2019   Assessment & Plan    Replete and monitor       Underweight in setting of chronic illness a/e/b BMI 15 8-17 3 requiring Nutrition/ Speech therapy consult with puree/level 1 diet and honey thick liquids       VTE Pharmacologic Prophylaxis:   Pharmacologic: Heparin  Mechanical VTE Prophylaxis in Place: Yes    Patient Centered Rounds: I have performed bedside rounds with nursing staff today  Discussions with Specialists or Other Care Team Provider: Yes    Education and Discussions with Family / Patient:Yes    Time Spent for Care: 45 minutes  More than 50% of total time spent on counseling and coordination of care as described above      Current Length of Stay: 2 day(s)    Current Patient Status: Inpatient     Discharge Plan:  Back to skilled nursing facility    Code Status: Level 3 - DNAR and DNI    Certification Statement: The patient will continue to require additional inpatient hospital stay due to COPD exacerbation secondary to bronchitis      Subjective:   Still with cough, appears to be loosening up  Denies chest pain or shortness of breath      Objective:   Not in distress    Review of system is limited    Vitals:   Temp (24hrs), Av 9 °F (36 6 °C), Min:96 5 °F (35 8 °C), Max:98 8 °F (37 1 °C)    Temp:  [96 5 °F (35 8 °C)-98 8 °F (37 1 °C)] 98 8 °F (37 1 °C)  HR:  [59-84] 84  Resp:  [16-20] 18  BP: (124-166)/(55-80) 145/75  SpO2:  [91 %-98 %] 92 %  Body mass index is 17 48 kg/m²  Input and Output Summary (last 24 hours): Intake/Output Summary (Last 24 hours) at 01/21/19 1016  Last data filed at 01/21/19 0339   Gross per 24 hour   Intake              180 ml   Output              600 ml   Net             -420 ml       Physical Exam:     Physical Exam   Constitutional: She appears well-developed  No distress  Chronically ill, cachectic   HENT:   Head: Normocephalic and atraumatic  Nose: Nose normal    Eyes: Pupils are equal, round, and reactive to light  Conjunctivae are normal    Neck: Normal range of motion  Neck supple  Cardiovascular: Normal rate, regular rhythm and normal heart sounds  Pulmonary/Chest: Effort normal  No accessory muscle usage  No respiratory distress  She has decreased breath sounds  She has wheezes  She has no rhonchi  She has no rales  Abdominal: Soft  Bowel sounds are normal  She exhibits no distension  There is no tenderness  There is no rebound and no guarding  Musculoskeletal: She exhibits no edema  Right upper extremity contracture  Diffuse arthritic changes   Neurological: She is alert  No cranial nerve deficit  Expressive aphasia, right-sided weakness   Skin: Skin is warm and dry  No rash noted         Additional Data:     Labs:      Results from last 7 days  Lab Units 01/21/19  0458   WBC Thousand/uL 17 70*   HEMOGLOBIN g/dL 12 8   HEMATOCRIT % 40 9   PLATELETS Thousands/uL 365   NEUTROS PCT % 91*   LYMPHS PCT % 6*   MONOS PCT % 2*   EOS PCT % 0       Results from last 7 days  Lab Units 01/21/19  0458  01/19/19  0926   POTASSIUM mmol/L 3 5  < > 3 3*   CHLORIDE mmol/L 108  < > 105   CO2 mmol/L 30  < > 30   BUN mg/dL 26*  < > 24   CREATININE mg/dL 0 73  < > 0 78   CALCIUM mg/dL 9 1  < > 8 9   ALK PHOS U/L  --   --  78   ALT U/L  --   --  23   AST U/L  --   --  19   < > = values in this interval not displayed  Results from last 7 days  Lab Units 01/19/19  0926   INR  0 97       * I Have Reviewed All Lab Data Listed Above  * Additional Pertinent Lab Tests Reviewed: All Labs For Current Hospital Admission Reviewed    Imaging:  Xr Chest 1 View Portable    Result Date: 1/13/2019  Narrative: CHEST INDICATION:   One view chest 1/2/2017  COMPARISON:  None EXAM PERFORMED/VIEWS:  XR CHEST PORTABLE FINDINGS: Normal cardiac silhouette  Aortic calcification is present  No airspace consolidation, pneumothorax, pulmonary edema, or pleural effusion  Previously noted right apical opacity not visualized on this radiograph  Dextroconvex thoracic and levoconvex lumbar scoliosis  Degenerative changes bilateral acromioclavicular and glenohumeral joints  Chronic bilateral rotator cuff tears  Impression: No radiographic evidence of acute intrathoracic process  Previously noted right apical opacity not visualized on this radiograph  Workstation performed: [id number]     Xr Humerus Left    Addendum Date: 1/19/2019 Addendum:   ADDENDUM: The posterior aspect of the distal humerus is not seen on the lateral study  As such, this is incompletely  Please correlate with the reading for the elbow study  This should include evaluation of the portion of humerus missing from this study  Result Date: 1/19/2019  Narrative: LEFT HUMERUS INDICATION:   Ecchymosis  COMPARISON:  None VIEWS:  XR HUMERUS LEFT Images: 2 FINDINGS: There is no acute fracture or dislocation  There is impressive degenerative change of the glenohumeral joint  The rotator cuff interval is quite narrowed  There is associated scalloping of the undersurface of the acromion  This would be consistent with a chronic rotator cuff tear  No lytic or blastic lesions are seen  Soft tissues are unremarkable       Impression: Osteoarthritis and chronic rotator cuff tear Workstation performed: [id number]     Xr Elbow 3+ Views Left    Result Date: 1/19/2019  Narrative: LEFT ELBOW INDICATION:   Ecchymosis  FALL   PAIN AND BRUIING ENTIRE DORSAL FOREARM COMPARISON:  None VIEWS:  XR ELBOW 3+ VW LEFT FINDINGS: There is no acute fracture or dislocation  There is no joint effusion  No degenerative changes  No lytic or blastic lesions are seen  Soft tissues are unremarkable  Impression: No acute osseous abnormality  Workstation performed: [id number]     Xr Forearm 2 Views Left    Result Date: 1/19/2019  Narrative: LEFT FOREARM INDICATION:   Ecchymosis  FALL   PAIN AND BRUISING ENTIRE DORSAL FOREARM COMPARISON:  None VIEWS:  XR FOREARM 2 VW LEFT FINDINGS: There is no acute fracture or dislocation  No degenerative changes  No lytic or blastic lesions are seen  Soft tissues are unremarkable  Impression: No acute osseous abnormality  Workstation performed: [id number]     Xr Hip/pelv 2-3 Vws Right    Result Date: 1/13/2019  Narrative: RIGHT HIP INDICATION:   hip pain  COMPARISON:  Right hip radiographs 5/22/2017 VIEWS:  XR HIP/PELV 2-3 VWS RIGHT W PELVIS IF PERFORMED FINDINGS: There is no acute fracture or dislocation  Mild right hip osteoarthritis is seen  Minimal chronic sclerosis and subchondral lucency right femoral head may be sequela of chronic osteoarthritis or AVN  No significant interval change  3 metallic screws in the right femoral head and neck  Left hip prosthesis is intact  No secondary signs of loosening or infection  No lytic or blastic osseous lesions  Heterotopic ossification adjacent to the left acetabulum and proximal femur  Bilateral greater trochanter enthesopathy  Degenerative changes pubic symphysis and visualized lower lumbar spine  Abundant stool in the rectal vault  Impression: No acute osseous abnormality  Degenerative changes as described  Abundant stool in the rectal vault   Workstation performed: [id number]     Ct Head Without Contrast    Result Date: 1/13/2019  Narrative: CT BRAIN - WITHOUT CONTRAST INDICATION:   trauma  COMPARISON:  CT of the head on May 22, 2017  TECHNIQUE:  CT examination of the brain was performed  In addition to axial images, coronal 2D reformatted images were created and submitted for interpretation  Radiation dose length product (DLP) for this visit:  1375 28 mGy-cm   This examination, like all CT scans performed in the [facility], was performed utilizing techniques to minimize radiation dose exposure, including the use of iterative reconstruction and automated exposure control  IMAGE QUALITY:  Diagnostic  FINDINGS: PARENCHYMA: Decreased attenuation is noted in periventricular and subcortical white matter demonstrating an appearance that is statistically most likely to represent moderate microangiopathic change  Redemonstrated right basal ganglia chronic lacunar infarcts  Redemonstrated chronic left middle cerebral artery territory infarction  No CT signs of acute infarction  No intracranial mass, mass effect or midline shift  No acute parenchymal hemorrhage  VENTRICLES AND EXTRA-AXIAL SPACES:  Normal for the patient's age  VISUALIZED ORBITS AND PARANASAL SINUSES:  Small mucous retention cyst in the left maxillary sinus  CALVARIUM AND EXTRACRANIAL SOFT TISSUES:  Normal      Impression: 1  No intracranial hemorrhage or calvarial fracture  2   Chronic small vessel ischemic changes  3   Redemonstrated chronic left middle cerebral artery territory infarction  Workstation performed: [id number]     Ct Chest Wo Contrast    Result Date: 1/19/2019  Narrative: CT CHEST WITHOUT IV CONTRAST INDICATION:   Shortness of breath Lung mass  COMPARISON:  12/20/2016  TECHNIQUE: CT examination of the chest was performed without intravenous contrast   Axial, sagittal, and coronal 2D reformatted images were created from the source data and submitted for interpretation   Radiation dose length product (DLP) for this visit:  149 04 mGy-cm   This examination, like all CT scans performed in the [facility], was performed utilizing techniques to minimize radiation dose exposure, including the use of iterative  reconstruction and automated exposure control  FINDINGS: LUNGS:  There has been decrease in size of the previously seen right apical mass, now measuring approximately to approximately 1 3 x 1 3 cm on image 3/21 (previously 3 1 x 2 4 cm)  There is minimal bibasilar compressive atelectasis  Advanced centrilobular emphysema bilaterally  There is no tracheal or endobronchial lesion  PLEURA:  Small bilateral pleural effusions, right greater than left  HEART/GREAT VESSELS:  Normal heart size  Coronary artery calcifications noted  Normal caliber thoracic aorta with diffuse atherosclerotic calcifications  MEDIASTINUM AND DANIELLA:  Unremarkable  CHEST WALL AND LOWER NECK:   Unremarkable  VISUALIZED STRUCTURES IN THE UPPER ABDOMEN:  Status post cholecystectomy  OSSEOUS STRUCTURES:  No acute fracture or destructive osseous lesion  Impression: Small bilateral pleural effusions with mild bibasilar compressive atelectasis  Diminished size of the right apical mass  Advanced COPD  Workstation performed: [id number]     Ct Cervical Spine Without Contrast    Result Date: 1/13/2019  Narrative: CT CERVICAL SPINE - WITHOUT CONTRAST INDICATION:   trauma  COMPARISON:  CT of the chest on December 20, 2016  TECHNIQUE:  CT examination of the cervical spine was performed without intravenous contrast   Contiguous axial images were obtained  Sagittal and coronal reconstructions were performed  Radiation dose length product (DLP) for this visit:  320 mGy-cm   This examination, like all CT scans performed in the [facility], was performed utilizing techniques to minimize radiation dose exposure, including the use of iterative reconstruction and automated exposure control  IMAGE QUALITY:  Diagnostic   FINDINGS: ALIGNMENT:  Normal alignment of the cervical spine  Grade 1 anterolisthesis of C3 on C4 likely degenerative    VERTEBRAL BODIES:  No acute fracture  DEGENERATIVE CHANGES:  Moderate multilevel cervical degenerative changes are noted  No critical central canal stenosis  Degenerative changes are most pronounced at C5-C6 and C6-C7 where there is mild bony spinal canal and moderate neural foraminal narrowing  PREVERTEBRAL AND PARASPINAL SOFT TISSUES:  Unremarkable  THORACIC INLET:  1 2 x 0 9 cm mass in the right upper lobe decreased in size since 12/20/2016  Impression: No cervical spine fracture or traumatic malalignment  Workstation performed: [id number]     Xr Chest 1 View    Result Date: 1/19/2019  Narrative: CHEST INDICATION:   Shortness of breath  History of COPD  Status post fall    COMPARISON:  1/13/2019 EXAM PERFORMED/VIEWS:  XR CHEST 1 VIEW FINDINGS: Heart shadow appears unremarkable  Atherosclerotic vascular calcifications are noted  The lungs are clear  No pneumothorax or pleural effusion  Osseous structures are stable with moderate to severe S-shaped scoliosis, centered at the thoracolumbar junction  Advanced degenerative changes of both shoulders noted  Impression: No acute cardiopulmonary disease   Workstation performed: [id number]     Imaging Reports Reviewed by myself    Cultures:   Blood Culture: No results found for: BLOODCX  Urine Culture:   Lab Results   Component Value Date    URINECX >100,000 cfu/ml Staphylococcus coagulase negative 01/11/2017    URINECX 50,000-59,000 cfu/ml Mixed Contaminants X2 01/11/2017    URINECX <10,000 cfu/ml Mixed Contaminants X2 01/02/2017    URINECX No Growth <1000 cfu/mL 01/02/2017    URINECX No Growth <1000 cfu/mL 12/25/2016     Sputum Culture: No components found for: SPUTUMCX  Wound Culture: No results found for: WOUNDCULT    Last 24 Hours Medication List:     Current Facility-Administered Medications:  acetaminophen 325 mg Oral Daily Thai Santos MD   acetaminophen 650 mg Oral Q6H PRN Kuldeep Rubio MD   artificial tear 1 application Both Eyes HS Kuldeep Rubio MD   aspirin 81 mg Oral Daily Kuldeep Rubio MD   atorvastatin 20 mg Oral Daily With Oral Comings, MD   azithromycin 250 mg Oral Q24H Kuldeep Rubio MD   diltiazem 30 mg Oral Q6H Kuldeep Rubio MD   docusate sodium 200 mg Oral Daily Kuldeep Rubio MD   donepezil 10 mg Oral HS Kuldeep Rubio MD   gabapentin 200 mg Oral BID Kuldeep Rubio MD   heparin (porcine) 5,000 Units Subcutaneous [facility] Kuldeep Rubio MD   HYDROcodone-acetaminophen 1 tablet Oral Q6H PRN Kuldeep Rubio MD   ipratropium-albuterol 3 mL Nebulization Q6H Kuldeep Rubio MD   ipratropium-albuterol 3 mL Nebulization Q4H PRN Kuldeep Rubio MD   methylPREDNISolone sodium succinate 40 mg Intravenous Q12H Timoteo Hanks MD   metoprolol tartrate 75 mg Oral Q12H Kuldeep Rubio MD   ondansetron 4 mg Intravenous Q8H PRN Kuldeep Rubio MD   polyethylene glycol 17 g Oral Daily PRN Kuldeep Rubio MD        Today, Patient Was Seen By: Kuldeep Rubio MD    ** Please Note: "This note has been constructed using a voice recognition system  Therefore there may be syntax, spelling, and/or grammatical errors   Please call if you have any questions  "**

## 2019-01-21 NOTE — SOCIAL WORK
Met with pt  Has substantial expressive aphasia  Seemed to nod head appropriately  Was unable to Interpret if her plan was to return to [facility] when medically cleared  Left vm for [facility], listed on face sheet  Await call back

## 2019-01-21 NOTE — PLAN OF CARE
Problem: DISCHARGE PLANNING - CARE MANAGEMENT  Goal: Discharge to post-acute care or home with appropriate resources  INTERVENTIONS:  - Conduct assessment to determine patient/family and health care team treatment goals, and need for post-acute services based on payer coverage, community resources, and patient preferences, and barriers to discharge  - Address psychosocial, clinical, and financial barriers to discharge as identified in assessment in conjunction with the patient/family and health care team  - Arrange appropriate level of post-acute services according to patient's   needs and preference and payer coverage in collaboration with the physician and health care team  - Communicate with and update the patient/family, physician, and health care team regarding progress on the discharge plan  - Arrange appropriate transportation to post-acute venues  Outcome: Adequate for Discharge  Plan:  Return to nursing facility when medically stable

## 2019-01-22 VITALS
HEART RATE: 74 BPM | OXYGEN SATURATION: 90 % | DIASTOLIC BLOOD PRESSURE: 46 MMHG | HEIGHT: 64 IN | SYSTOLIC BLOOD PRESSURE: 178 MMHG | BODY MASS INDEX: 15.85 KG/M2 | RESPIRATION RATE: 18 BRPM | WEIGHT: 92.81 LBS | TEMPERATURE: 98.8 F

## 2019-01-22 PROBLEM — R63.6 UNDERWEIGHT DUE TO INADEQUATE CALORIC INTAKE: Status: ACTIVE | Noted: 2019-01-22

## 2019-01-22 LAB
ANION GAP SERPL CALCULATED.3IONS-SCNC: 8 MMOL/L (ref 4–13)
BASOPHILS # BLD AUTO: 0.03 THOUSANDS/ΜL (ref 0–0.1)
BASOPHILS NFR BLD AUTO: 0 % (ref 0–1)
BUN SERPL-MCNC: 25 MG/DL (ref 5–25)
CALCIUM SERPL-MCNC: 9.2 MG/DL (ref 8.3–10.1)
CHLORIDE SERPL-SCNC: 105 MMOL/L (ref 100–108)
CO2 SERPL-SCNC: 30 MMOL/L (ref 21–32)
CREAT SERPL-MCNC: 0.69 MG/DL (ref 0.6–1.3)
EOSINOPHIL # BLD AUTO: 0 THOUSAND/ΜL (ref 0–0.61)
EOSINOPHIL NFR BLD AUTO: 0 % (ref 0–6)
ERYTHROCYTE [DISTWIDTH] IN BLOOD BY AUTOMATED COUNT: 12.2 % (ref 11.6–15.1)
GFR SERPL CREATININE-BSD FRML MDRD: 80 ML/MIN/1.73SQ M
GLUCOSE SERPL-MCNC: 192 MG/DL (ref 65–140)
HCT VFR BLD AUTO: 44.2 % (ref 34.8–46.1)
HGB BLD-MCNC: 13.7 G/DL (ref 11.5–15.4)
IMM GRANULOCYTES # BLD AUTO: 0.12 THOUSAND/UL (ref 0–0.2)
IMM GRANULOCYTES NFR BLD AUTO: 1 % (ref 0–2)
LYMPHOCYTES # BLD AUTO: 0.91 THOUSANDS/ΜL (ref 0.6–4.47)
LYMPHOCYTES NFR BLD AUTO: 6 % (ref 14–44)
MCH RBC QN AUTO: 31.4 PG (ref 26.8–34.3)
MCHC RBC AUTO-ENTMCNC: 31 G/DL (ref 31.4–37.4)
MCV RBC AUTO: 101 FL (ref 82–98)
MONOCYTES # BLD AUTO: 0.51 THOUSAND/ΜL (ref 0.17–1.22)
MONOCYTES NFR BLD AUTO: 3 % (ref 4–12)
NEUTROPHILS # BLD AUTO: 14.83 THOUSANDS/ΜL (ref 1.85–7.62)
NEUTS SEG NFR BLD AUTO: 90 % (ref 43–75)
NRBC BLD AUTO-RTO: 0 /100 WBCS
PLATELET # BLD AUTO: 401 THOUSANDS/UL (ref 149–390)
PMV BLD AUTO: 9.8 FL (ref 8.9–12.7)
POTASSIUM SERPL-SCNC: 3.5 MMOL/L (ref 3.5–5.3)
RBC # BLD AUTO: 4.37 MILLION/UL (ref 3.81–5.12)
SODIUM SERPL-SCNC: 143 MMOL/L (ref 136–145)
WBC # BLD AUTO: 16.4 THOUSAND/UL (ref 4.31–10.16)

## 2019-01-22 PROCEDURE — 99232 SBSQ HOSP IP/OBS MODERATE 35: CPT | Performed by: INTERNAL MEDICINE

## 2019-01-22 PROCEDURE — 97760 ORTHOTIC MGMT&TRAING 1ST ENC: CPT

## 2019-01-22 PROCEDURE — 92526 ORAL FUNCTION THERAPY: CPT

## 2019-01-22 PROCEDURE — 94760 N-INVAS EAR/PLS OXIMETRY 1: CPT

## 2019-01-22 PROCEDURE — 99239 HOSP IP/OBS DSCHRG MGMT >30: CPT | Performed by: INTERNAL MEDICINE

## 2019-01-22 PROCEDURE — 97535 SELF CARE MNGMENT TRAINING: CPT

## 2019-01-22 PROCEDURE — 85025 COMPLETE CBC W/AUTO DIFF WBC: CPT | Performed by: INTERNAL MEDICINE

## 2019-01-22 PROCEDURE — 80048 BASIC METABOLIC PNL TOTAL CA: CPT | Performed by: INTERNAL MEDICINE

## 2019-01-22 PROCEDURE — 94640 AIRWAY INHALATION TREATMENT: CPT

## 2019-01-22 PROCEDURE — 93970 EXTREMITY STUDY: CPT | Performed by: SURGERY

## 2019-01-22 RX ORDER — PREDNISONE 10 MG/1
10 TABLET ORAL DAILY
Refills: 0
Start: 2019-01-22

## 2019-01-22 RX ORDER — AZITHROMYCIN 250 MG/1
250 TABLET, FILM COATED ORAL EVERY 24 HOURS
Qty: 1 TABLET | Refills: 0
Start: 2019-01-23 | End: 2019-01-24

## 2019-01-22 RX ORDER — IPRATROPIUM BROMIDE AND ALBUTEROL SULFATE 2.5; .5 MG/3ML; MG/3ML
3 SOLUTION RESPIRATORY (INHALATION)
Refills: 0
Start: 2019-01-22

## 2019-01-22 RX ORDER — IPRATROPIUM BROMIDE AND ALBUTEROL SULFATE 2.5; .5 MG/3ML; MG/3ML
3 SOLUTION RESPIRATORY (INHALATION) EVERY 4 HOURS PRN
Refills: 0
Start: 2019-01-22

## 2019-01-22 RX ADMIN — IPRATROPIUM BROMIDE AND ALBUTEROL SULFATE 3 ML: 2.5; .5 SOLUTION RESPIRATORY (INHALATION) at 08:00

## 2019-01-22 RX ADMIN — ATORVASTATIN CALCIUM 20 MG: 20 TABLET, FILM COATED ORAL at 16:41

## 2019-01-22 RX ADMIN — METOPROLOL TARTRATE 75 MG: 50 TABLET, FILM COATED ORAL at 11:11

## 2019-01-22 RX ADMIN — DILTIAZEM HYDROCHLORIDE 30 MG: 30 TABLET, FILM COATED ORAL at 04:55

## 2019-01-22 RX ADMIN — GABAPENTIN 200 MG: 100 CAPSULE ORAL at 11:11

## 2019-01-22 RX ADMIN — HEPARIN SODIUM 5000 UNITS: 5000 INJECTION, SOLUTION INTRAVENOUS; SUBCUTANEOUS at 16:41

## 2019-01-22 RX ADMIN — IPRATROPIUM BROMIDE AND ALBUTEROL SULFATE 3 ML: 2.5; .5 SOLUTION RESPIRATORY (INHALATION) at 13:43

## 2019-01-22 RX ADMIN — HEPARIN SODIUM 5000 UNITS: 5000 INJECTION, SOLUTION INTRAVENOUS; SUBCUTANEOUS at 05:33

## 2019-01-22 RX ADMIN — IPRATROPIUM BROMIDE AND ALBUTEROL SULFATE 3 ML: 2.5; .5 SOLUTION RESPIRATORY (INHALATION) at 02:50

## 2019-01-22 RX ADMIN — DILTIAZEM HYDROCHLORIDE 30 MG: 30 TABLET, FILM COATED ORAL at 16:41

## 2019-01-22 RX ADMIN — DILTIAZEM HYDROCHLORIDE 30 MG: 30 TABLET, FILM COATED ORAL at 11:12

## 2019-01-22 RX ADMIN — METHYLPREDNISOLONE SODIUM SUCCINATE 40 MG: 40 INJECTION, POWDER, FOR SOLUTION INTRAMUSCULAR; INTRAVENOUS at 11:12

## 2019-01-22 RX ADMIN — AZITHROMYCIN 250 MG: 250 TABLET, FILM COATED ORAL at 11:15

## 2019-01-22 RX ADMIN — ASPIRIN 81 MG 81 MG: 81 TABLET ORAL at 11:13

## 2019-01-22 RX ADMIN — ACETAMINOPHEN 325 MG: 325 TABLET, FILM COATED ORAL at 11:11

## 2019-01-22 NOTE — OCCUPATIONAL THERAPY NOTE
OT TREATMENT       01/22/19 5564   Restrictions/Precautions   Other Precautions Fall Risk; Chair Alarm; Bed Alarm  (Nonfunctional RUE, expressive aphasia)   Pain Assessment   Pain Assessment No/denies pain   Pain Score No Pain   ADL   Eating Assistance 4  Minimal Assistance   Eating Deficit Setup;Supervision/safety; Increased time to complete;Scoop assist   Eating Comments Pt benefitted from assistance to scoop food out of container, able to grasp spoon but demonstrated difficulty lifting food from container  Benefitted from assistance to bring food to mouth 50% of the time  Pt able to transport contents of "magic cup" to mouth without spilling  Thick consistency of food increased pts ability to feed self as it reduced the amount of food spilled  ROM- Right Upper Extremities   R Shoulder PROM; Flexion  (to 90 degrees)   R Elbow PROM;Elbow flexion   R Weight/Reps/Sets 10 repetitions each with increased time for stretch to tolerance   ROM - Left Upper Extremities    L Shoulder PROM; Flexion  (to 90 degrees)   L Elbow PROM;Elbow flexion   L Weight/Reps/Sets 10 repetitions each with increased time for stretch to tolerance   Splinting   Location R wrist   Type Wrist cock up splint   Splinting Prefabricated (Wrist Cock Up Slint)   Splinting Comments OT fitted pt for R wrist cock up splint per Dr John Crystal order  However, pt has IV placed in R hand  Nurse notified and unable to remove IV  OT fitted pt for brace and brace was worn for approximately 2 minutes  Pt expressed that she was not feeling any discomfort while wearing brace and there were no signs of redness when splint was removed  Nurse educated on splint application with splint left in room  Cognition   Overall Cognitive Status Impaired   Arousal/Participation Cooperative   Activity Tolerance   Activity Tolerance Patient limited by fatigue   Medical Staff Made Aware Nurse notified of inability to leave splint on due to IV in R hand   Educated on assisting pt to don splint  Assessment   Assessment Pt was limited by fatigue during session but willing to particpate in all theraputic activities  Pt was fitted for and provided with wrist cock up splint brace per Dr Ceasar Guerrero order  However, pt unable to wear brace currently due to IV in R hand  Pt able to tolerate PROM shoulder flexion to 90 degrees and full elbow flexion bilaterally  During feeding activity pt benefitted from min A for feeding for scoop assist  50% of the time pt benefitted from assistance to bring spoon to mouth  Pt did not spill food, however, thick consistency of "magic cup" increased independence as it remains on the spoon  Pt will continue to benefit from skilled occupational therapy to address ADLs and safety with functional transfers     Recommendation   OT Discharge Recommendation 24 hour supervision/assist  ((return to SNF/[facility]))   Licensure   [place] License Number  Roddy Whitaker [address] 87, OTR/L [id number]

## 2019-01-22 NOTE — ASSESSMENT & PLAN NOTE
-interval improvement since prior CT scan  -likely resolving pneumonia  -will follow-up with CT scan approximately 6 weeks

## 2019-01-22 NOTE — PROGRESS NOTES
Progress Note - Pulmonary   Valdemar Lie 80 y o  female MRN: [id number]  Unit/Bed#: [address] [place] [address] Encounter: [id number]      Acute respiratory failure with hypoxia [facility])   Assessment & Plan    Clinically improving     Dysphagia   Assessment & Plan    -risk for chronic aspiration     Lung mass   Assessment & Plan    -interval improvement since prior CT scan  -likely resolving pneumonia  -will follow-up with CT scan approximately 6 weeks     * Chronic obstructive pulmonary disease with acute exacerbation (HCC)   Assessment & Plan    -continue steroids  -wean steroid taper 40, 30, 20, 10 x 3 days each  -continue short and long-acting beta agonist  -start LAMA in the outpatient setting  -acute exacerbation secondary to acute bronchitis  -finish course of antibiotics today is 4/5 days of azithromycin                 ______________________________________________________________________    Subjective: Pt seen and examined at bedside  No complaints    Tele Events:     Vitals:   Temp:  [97 5 °F (36 4 °C)-99 1 °F (37 3 °C)] 97 8 °F (36 6 °C)  HR:  [64-92] 80  Resp:  [16-18] 18  BP: (128-171)/(78-93) 163/80  Weight (last 2 days)     Date/Time   Weight    01/22/19 0600  42 1 (92 81)    01/21/19 0600  46 2 (101 85)    01/20/19 0600  45 9 (101 19)            Oxygen Therapy  SpO2: 94 %  O2 Flow Rate (L/min): 2 L/min    IV Infusions:       Nutrition:        Diet Orders            Start     Ordered    01/21/19 1456  Dietary nutrition supplements  Once     Question Answer Comment   Select Supplement: Magic Cup Chocolate    Frequency Lunch, Dinner        01/21/19 1456    01/20/19 1444  Diet Cardiovascular; Cardiac; Dysphagia 1-Pureed; Honey Thick Liquid  Diet effective now     Question Answer Comment   Diet Type Cardiovascular    Cardiac Cardiac    Other Restriction(s): Dysphagia 1-Pureed    Liquid Modifier Honey Thick Liquid    RD to adjust diet per protocol?  Yes        01/20/19 1443    01/20/19 0755  Room Service  Once Question:  Type of Service  Answer:  Room Service - Appropriate with Assistance    01/20/19 0754          Ins/Outs:   I/O       01/20 0701 - 01/21 0700 01/21 0701 - 01/22 0700 01/22 0701 - 01/23 0700    P  O  380      Total Intake(mL/kg) 380 (8 2)      Urine (mL/kg/hr) 600 (0 5) 500 (0 5)     Total Output 600 500      Net -220 -500             Unmeasured Urine Occurrence 2 x 2 x     Unmeasured Stool Occurrence 2 x            Lines/Drains:  Invasive Devices     Peripheral Intravenous Line            Peripheral IV 01/20/19 Right Hand 1 day                 Active medications:  Scheduled Meds:  Current Facility-Administered Medications:  acetaminophen 325 mg Oral Daily Nisahnt Alegre MD   acetaminophen 650 mg Oral Q6H PRN Nishant Alegre MD   artificial tear 1 application Both Eyes HS Nishant Alegre MD   aspirin 81 mg Oral Daily Nishant Alegre MD   atorvastatin 20 mg Oral Daily With Doc MD Cyndie   azithromycin 250 mg Oral Q24H Nishant Alegre MD   diltiazem 30 mg Oral Q6H Nishant Alegre MD   docusate sodium 200 mg Oral Daily Nishant Alegre MD   donepezil 10 mg Oral HS Nishant Alegre MD   gabapentin 200 mg Oral BID Nishant Alegre MD   heparin (porcine) 5,000 Units Subcutaneous Q8H [facility] Nishant Alegre MD   HYDROcodone-acetaminophen 1 tablet Oral Q6H PRN Nishant Alegre MD   ipratropium-albuterol 3 mL Nebulization Q6H Nishant Alegre MD   ipratropium-albuterol 3 mL Nebulization Q4H PRN Nishant Alegre MD   methylPREDNISolone sodium succinate 40 mg Intravenous Q12H [facility] Nishant Alegre MD   metoprolol tartrate 75 mg Oral Q12H Nishant Alegre MD   ondansetron 4 mg Intravenous Q8H PRN Nishant Alegre MD   polyethylene glycol 17 g Oral Daily PRN Nishant Alegre MD     PRN Meds:  acetaminophen 650 mg Q6H PRN   HYDROcodone-acetaminophen 1 tablet Q6H PRN   ipratropium-albuterol 3 mL Q4H PRN   ondansetron 4 mg Q8H PRN   polyethylene glycol 17 g Daily PRN ____________________________________________________________________      Physical Exam   Constitutional: She is oriented to person, place, and time  She appears well-developed  Elderly, frail, cachectic   HENT:   Head: Normocephalic and atraumatic  Eyes: Pupils are equal, round, and reactive to light  Conjunctivae are normal    Neck: Normal range of motion  Neck supple  Cardiovascular: Normal rate, regular rhythm and normal heart sounds  Pulmonary/Chest: Effort normal and breath sounds normal  No respiratory distress  She has no wheezes  She has no rales  She exhibits no tenderness  Abdominal: Soft  Bowel sounds are normal    Musculoskeletal: Normal range of motion  She exhibits no edema  Neurological: She is alert and oriented to person, place, and time  Right-sided facial droop    Right-sided facial weakness    Right hemiplegia   Skin: Skin is warm and dry     Psychiatric: She has a normal mood and affect            ____________________________________________________________________    Labs:   CBC:   Results from last 7 days  Lab Units 01/22/19  0454 01/21/19  0458 01/20/19  0530   WBC Thousand/uL 16 40* 17 70* 10 35*   HEMOGLOBIN g/dL 13 7 12 8 12 3   HEMATOCRIT % 44 2 40 9 39 1   MCV fL 101* 100* 100*   PLATELETS Thousands/uL 401* 365 310     CMP:   Results from last 7 days  Lab Units 01/22/19  0454 01/21/19  0458 01/20/19  0530 01/19/19  0926   POTASSIUM mmol/L 3 5 3 5 3 4* 3 3*   CHLORIDE mmol/L 105 108 107 105   CO2 mmol/L 30 30 27 30   BUN mg/dL 25 26* 22 24   CREATININE mg/dL 0 69 0 73 0 64 0 78   CALCIUM mg/dL 9 2 9 1 8 8 8 9   AST U/L  --   --   --  19   ALT U/L  --   --   --  23   ALK PHOS U/L  --   --   --  78   EGFR ml/min/1 73sq m 80 76 82 70     No components found for: ABG    Magnesium:   Results from last 7 days  Lab Units 01/19/19  0926   MAGNESIUM mg/dL 2 0     Phosphorous:   Results from last 7 days  Lab Units 01/19/19  0926   PHOSPHORUS mg/dL 2 7     Troponin:   Results from last 7 days  Lab Units 01/20/19  0144 01/19/19  1607   TROPONIN I ng/mL <0 02 0 02     PT/INR:   Results from last 7 days  Lab Units 01/19/19  0926   PTT seconds 30   INR  0 97     Lactic Acid:     BNP:   Results from last 7 days  Lab Units 01/19/19  0926   NT-PRO BNP pg/mL 884*     TSH:     Procalcitonin: Invalid input(s): PROCALCITONIN      Imaging:   CT chest wo contrast   Final Result by Tobias Zazueta MD (01/19 1901)      Small bilateral pleural effusions with mild bibasilar compressive atelectasis  Diminished size of the right apical mass  Advanced COPD  Workstation performed: [id number]         XR forearm 2 views LEFT   Final Result by Duong Rojas MD (01/19 1629)      No acute osseous abnormality  Workstation performed: [id number]         XR elbow 3+ views LEFT   Final Result by Duong Rojas MD (01/19 1628)      No acute osseous abnormality  Workstation performed: [id number]         XR chest 1 view   Final Result by Duong Rojas MD (01/19 1627)      No acute cardiopulmonary disease  Workstation performed: [id number]         XR humerus LEFT   Final Result by Wandy Diamond MD (01/19 1037)   Addendum 1 of 1 by Wandy Diamond MD (01/19 1039)   ADDENDUM:      The posterior aspect of the distal humerus is not seen on the lateral    study  As such, this is incompletely  Please correlate with the reading    for the elbow study  This should include evaluation of the portion of    humerus missing from this study        Final      Osteoarthritis and chronic rotator cuff tear            Workstation performed: [id number]         VAS lower limb venous duplex study, complete bilateral    (Results Pending)         Micro: Lab Results   Component Value Date    URINECX >100,000 cfu/ml Staphylococcus coagulase negative 01/11/2017    URINECX 50,000-59,000 cfu/ml Mixed Contaminants X2 01/11/2017    URINECX <10,000 cfu/ml Mixed Contaminants X2 01/02/2017    URINECX No Growth <1000 cfu/mL 01/02/2017    MRSACULTURE  01/19/2019     No Methicillin Resistant Staphlyococcus aureus (MRSA) isolated            Invalid input(s): LEGIONELLAURINARYANTIGEN        Code Status: Level 3 - DNAR and DNI

## 2019-01-22 NOTE — PLAN OF CARE
DISCHARGE PLANNING     Discharge to home or other facility with appropriate resources Progressing        DISCHARGE PLANNING - CARE MANAGEMENT     Discharge to post-acute care or home with appropriate resources Progressing        Knowledge Deficit     Patient/family/caregiver demonstrates understanding of disease process, treatment plan, medications, and discharge instructions Progressing        Nutrition/Hydration-ADULT     Nutrient/Hydration intake appropriate for improving, restoring or maintaining nutritional needs Progressing        Potential for Falls     Patient will remain free of falls Progressing        Prexisting or High Potential for Compromised Skin Integrity     Skin integrity is maintained or improved Progressing        RESPIRATORY - ADULT     Achieves optimal ventilation and oxygenation Progressing

## 2019-01-22 NOTE — PLAN OF CARE
Problem: SLP ADULT - SWALLOWING, IMPAIRED  Goal: Advance to least restrictive diet without signs or symptoms of aspiration for planned discharge setting  See evaluation for individualized goals         Outcome: Adequate for Discharge

## 2019-01-22 NOTE — [place] UNIVERSAL TRANSFER FORM
[place] UNIVERSAL TRANSFER FORM  (ALL ITEMS MUST BE COMPLETED)    1  TRANSFER FROM: [address] [facility]      TRANSFER TO: [facility]    2  DATE OF TRANSFER: 1/22/2019                        TIME OF TRANSFER: 1730    3  PATIENT NAME: Gardenia Schlatter, E      YOB: 1934                             GENDER: female    4  LANGUAGE:   English    5  PHYSICIAN NAME:  Johanna Guido MD                   PHONE: [phone]    6  CODE STATUS: Level 3 - DNAR and DNI        Out of Hospital DNR Attached: Yes    7  :                                      :  Extended Emergency Contact Information  Primary Emergency Contact: ShubhamryanLinda   [facility] [address] Phone: [phone]  Mobile Phone: [phone]  Relation: Child  Secondary Emergency Contact: Rustam Velásquez   [facility] [address] Phone: [phone]  Mobile Phone: [phone]  Relation: Other           Health Care Representative/Proxy:  No           Legal Guardian:  Yes             NAME OF:           HEALTH CARE REPRESENTATIVE/PROXY:                                         OR           LEGAL GUARDIAN, IF NOT :                                               PHONE:  (Day)           (Night)                        (Cell)    8  REASON FOR TRANSFER: (Must include brief medical history and recent changes in physical function or cognition ) Returning home            V/S: BP (!) 178/46 (BP Location: Left arm)   Pulse 74   Temp 98 8 °F (37 1 °C) (Tympanic)   Resp 18   Ht 5' 4" (1 626 m)   Wt 42 1 kg (92 lb 13 oz)   SpO2 90%   BMI 15 93 kg/m²           PAIN: None    9  PRIMARY DIAGNOSIS: Chronic obstructive pulmonary disease with acute exacerbation (HCC)      Secondary Diagnosis:         Pacemaker: No      Internal Defib: No          Mental Health Diagnosis (if Applicable):    10  RESTRAINTS: No     11   RESPIRATORY NEEDS: Oxygen Device nasal katina,    12  ISOLATION/PRECAUTION: Oxygen Device 2 Liters,    13  ALLERGY: Sulfa antibiotics    14  SENSORY:       Vision Poor and Speech Difficult    15  SKIN CONDITION: Yes:  Pressure    16  DIET: Special (describe)pureed honey thick    17  IV ACCESS: None    18  PERSONAL ITEMS SENT WITH PATIENT: Hearing Aid Right    19  ATTACHED DOCUMENTS: MUST ATTACH CURRENT MEDICATION INFORMATION Face Sheet, MAR, Medication Reconciliation and TAR    20  AT RISK ALERTS:Pressure Ulcer        HARM TO: N/A    21  WEIGHT BEARING STATUS:         Left Leg: Limited        Right Leg: Limited    22  MENTAL STATUS:Alert, Oriented and Forgetful    23  FUNCTION:        Walk: Not Able        Transfer: Not Able        Toilet: Not Able        Feed: With Help    24  IMMUNIZATIONS/SCREENING:     Immunization History   Administered Date(s) Administered    Influenza Split High Dose Preservative Free IM 09/23/2016       25  BOWEL: Incontinent     26  BLADDER: Incontinent    27   SENDING FACILITY CONTACT: Shahana Bryson                  Title: RN         Unit: [address] [facility]        Phone: [phone] [address] (if known):        Title:        Unit:         Phone:         FORM PREFILLED BY (if applicable)       Title:       Unit:        Phone:         FORM COMPLETED BY Ifeanyi Lara RN      Title: [facility]      Phone: [phone]

## 2019-01-22 NOTE — PLAN OF CARE
Problem: OCCUPATIONAL THERAPY ADULT  Goal: Performs self-care activities at highest level of function for planned discharge setting  See evaluation for individualized goals  Outcome: Progressing  Limitation: Decreased ADL status, Decreased UE strength, Decreased UE ROM, Decreased Safe judgement during ADL, Decreased cognition, Decreased endurance, Decreased self-care trans, Decreased high-level ADLs (decreased balance)  Prognosis: Fair  Assessment: Pt was limited by fatigue during session but willing to particpate in all theraputic activities  Pt was fitted for and provided with wrist cock up splint brace per Dr Matute [employer] order  However, pt unable to wear brace currently due to IV in R hand  Pt able to tolerate PROM shoulder flexion to 90 degrees and full elbow flexion bilaterally  During feeding activity pt benefitted from min A for feeding for scoop assist  50% of the time pt benefitted from assistance to bring spoon to mouth  Pt did not spill food, however, thick consistency of "magic cup" increased independence as it remains on the spoon  Pt will continue to benefit from skilled occupational therapy to address ADLs and safety with functional transfers       OT Discharge Recommendation: 24 hour supervision/assist ((return to SNF/[facility]))

## 2019-01-22 NOTE — ASSESSMENT & PLAN NOTE
-continue steroids  -wean steroid taper 40, 30, 20, 10 x 3 days each  -continue short and long-acting beta agonist  -start LAMA in the outpatient setting  -acute exacerbation secondary to acute bronchitis  -finish course of antibiotics today is 4/5 days of azithromycin

## 2019-01-22 NOTE — SPEECH THERAPY NOTE
Speech/Language Pathology Progress Note    Patient Name: Shirley FRIEDMAN Date: 1/22/2019     Subjective: Fully alert & attempting to communicate (pointing to her cat)    Objective:  Pt was seen for diagnostic dysphagia tx session after review of records (and questions from staff re: diet)  Both thick and thin liquid in room (thin water not within reach)  Pt gesturing, hoping I would recline her for po intake (did partially recline but supported head for safe positioning for po intake)    Pt was assessed c puree (mousse), magic cup and honey thick liquid  Slow but functional transfer  Minimal residue c puree/magic cup  No cough, throat clearing or change in BS c intake of puree  Given trial sips of nectar thick water, episodic throat clearing and cough noted (trials d/c)  Assessment:  Pt on safe diet & thinner liquids not recommended a this time  Plan/Recommendations:  No additional SLP services indicated at the acute level of care

## 2019-01-23 ENCOUNTER — PATIENT OUTREACH (OUTPATIENT)
Dept: CASE MANAGEMENT | Facility: HOSPITAL | Age: 84
End: 2019-01-23

## 2019-01-23 DIAGNOSIS — R52 PAIN: ICD-10-CM

## 2019-01-23 RX ORDER — HYDROCODONE BITARTRATE AND ACETAMINOPHEN 5; 325 MG/1; MG/1
TABLET ORAL
Qty: 60 TABLET | Refills: 0 | Status: SHIPPED | OUTPATIENT
Start: 2019-01-23 | End: 2019-03-13 | Stop reason: SDUPTHER

## 2019-01-23 NOTE — PROGRESS NOTES
HFU call, s/w nursing sup at [facility]) who reports he has not seen pt yet today, and she is in bed but doing fine  Review of admission orders occurring at this time, no questions at this moment  COPD team number given and encouraged to call with any questions or concerns or to assist in making followup appts if necessary

## 2019-01-23 NOTE — ASSESSMENT & PLAN NOTE
Due to chronic illness with BMI of 16-17  Continue pureed diet with honey thick liquids and as needed nutritional supplementation

## 2019-01-23 NOTE — DISCHARGE SUMMARY
Discharge- Rupali [facility] 1934, 80 y o  female MRN: [id number]    Unit/Bed#: [address] [place] [address] Encounter: [id number]    Primary Care Provider: Kesha Hdz MD   Date and time admitted to hospital: 1/19/2019  8:54 AM        * Chronic obstructive pulmonary disease with acute exacerbation ([facility] [address] )   Assessment & Plan    Secondary to acute bronchitis   Presented with cough shortness of breath, wheezing and leukocytosis  Concerns of aspiration secondary to dysphagia  Improving clinically  Bronchodilators  Patient received IV Solu-Medrol which will be changed to prednisone taper  Continue azithromycin for another day , DCed Rocephin  Chest CT with changes of advanced COPD, no acute infiltrates  Trend procalcitonin   Continue supplemental oxygen as tolerated  Aspiration precaution, chest PT  Follow up with Pulmonary     Acute respiratory failure with hypoxia (HCC)   Assessment & Plan    Possibly related to above  Continue supplemental oxygen   Prior imaging noted patient has significant centrilobular emphysematous changes  CT chest with advanced COPD  Continue oxygen supplementation     Dysphagia   Assessment & Plan    Secondary to CVA  On puree with honey thick liquid diet  Family reports intermittent cough with meal  Speech and swallow evaluation noted yesterday recommended to continue puree with honey thick  Continue aspiration precautions     Lung mass   Assessment & Plan    Noted in 2016, right upper lobe  Patient and family declined any further workup  Repeat CT with right upper lobe mass, smaller in size compared to prior    Possibly scarring or inflammatory  Follow up with Pulmonary as outpatient     Paroxysmal atrial fibrillation (HCC)   Assessment & Plan    In sinus rhythm  Continue metoprolol and Cardizem  Prior records reviewed  Not a candidate for anticoagulation secondary to hemorrhagic conversion of ischemic CVA and fall risk     HTN (hypertension)   Assessment & Plan    Stable  Continue Cardizem and metoprolol     Ischemic stroke [facility])   Assessment & Plan    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  Currently at her baseline  Continue aspirin, statin  PT/OT/ST     Leg swelling   Assessment & Plan    Lower extremity venous Doppler showed chronic DVT in the right posterior tibial, peroneal and soleal veins     Leukocytosis   Assessment & Plan    Secondary to steroids  Monitor     Underweight due to inadequate caloric intake   Assessment & Plan    Due to chronic illness with BMI of 16-17  Continue pureed diet with honey thick liquids and as needed nutritional supplementation     UTI (urinary tract infection)   Assessment & Plan    Recently diagnosed at last ER visit  Treated with Macrobid       Hypokalemiaresolved as of 1/21/2019   Assessment & Plan    Replete and monitor           Discharging Physician / Practitioner: Linsey De La Fuente MD  PCP: Tio Alcazar MD  Admission Date: 1/19/2019  Discharge Date: 01/22/19    Reason for Admission: Shortness of Breath (Pt arrived via squad from nursing home  Started at 0600 with SOB  Received neb at [facility]    Arrived awake and alert in no acute distress)        Resolved Problems  Date Reviewed: 1/22/2019          Resolved    Hypokalemia 1/21/2019     Resolved by  Patric Guerra MD          Consultations During Hospital Stay:  IP CONSULT TO PULMONOLOGY      Significant Findings / Test Results:     ·     Results from last 7 days  Lab Units 01/22/19  0454 01/21/19  0458 01/20/19  0530   WBC Thousand/uL 16 40* 17 70* 10 35*   HEMOGLOBIN g/dL 13 7 12 8 12 3   PLATELETS Thousands/uL 401* 365 310       Results from last 7 days  Lab Units 01/22/19  0454 01/21/19  0458 01/20/19  0530 01/19/19  0926   SODIUM mmol/L 143 145 143 144   POTASSIUM mmol/L 3 5 3 5 3 4* 3 3*   CHLORIDE mmol/L 105 108 107 105   CO2 mmol/L 30 30 27 30   BUN mg/dL 25 26* 22 24   CREATININE mg/dL 0 69 0 73 0 64 0 78   CALCIUM mg/dL 9 2 9 1 8 8 8 9   TOTAL BILIRUBIN mg/dL  --   -- --  0 30   ALK PHOS U/L  --   --   --  78   ALT U/L  --   --   --  23   AST U/L  --   --   --  19       Results from last 7 days  Lab Units 01/19/19  0926   INR  0 97       Results from last 7 days  Lab Units 01/20/19  0144 01/19/19  1607 01/19/19  0926   TROPONIN I ng/mL <0 02 0 02 <0 02     No results found for: HGBA1C        Results from last 7 days  Lab Units 01/20/19  0144   PROCALCITONIN ng/ml <0 05     Blood Culture: No results found for: BLOODCX  Urine Culture:   Lab Results   Component Value Date    URINECX >100,000 cfu/ml Staphylococcus coagulase negative 01/11/2017    URINECX 50,000-59,000 cfu/ml Mixed Contaminants X2 01/11/2017    URINECX <10,000 cfu/ml Mixed Contaminants X2 01/02/2017    URINECX No Growth <1000 cfu/mL 01/02/2017     Sputum Culture: No components found for: SPUTUMCX  Wound Culture: No results found for: WOUNDCULT     VAS lower limb venous duplex study, complete bilateral   Final Result by Viola Walker MD (01/22 1413)      CT chest wo contrast   Final Result by Shayla Eller MD (01/19 1901)      Small bilateral pleural effusions with mild bibasilar compressive atelectasis  Diminished size of the right apical mass  Advanced COPD  Workstation performed: [id number]         XR forearm 2 views LEFT   Final Result by Zina Farmer MD (01/19 1629)      No acute osseous abnormality  Workstation performed: [id number]         XR elbow 3+ views LEFT   Final Result by Zina Farmer MD (01/19 1628)      No acute osseous abnormality  Workstation performed: [id number]         XR chest 1 view   Final Result by Zina Farmer MD (01/19 1627)      No acute cardiopulmonary disease  Workstation performed: [id number]         XR humerus LEFT   Final Result by Lauren Stearns MD (01/19 1037)   Addendum 1 of 1 by Lauren Stearns MD (01/19 1039)   ADDENDUM:      The posterior aspect of the distal humerus is not seen on the lateral    study    As such, this is incompletely  Please correlate with the reading    for the elbow study  This should include evaluation of the portion of    humerus missing from this study  Final      Osteoarthritis and chronic rotator cuff tear            Workstation performed: [id number]              Outpatient Tests Requested:  · Follow-up with Pulmonary    Complications:  None    Reason for Admission:   Chief Complaint   Patient presents with    Shortness of Breath     Pt arrived via squad from nursing home  Started at 0600 with SOB  Received neb at [facility]  Arrived awake and alert in no acute distress       Hospital Course:     Aleyda Robles is a 80 y o  female patient with a PMH of hypertension, hyperlipidemia, gout, emphysema, dementia, CVA who originally presented to the hospital on 1/19/2019 due to shortness of breath, wheezing with hypoxia  Patient admitted hospital with COPD exacerbation and was initially started on IV Rocephin Zithromax  CT chest showed severe emphysema  Patient was seen in consultation with Pulmonary  CT of the chest showed improving right upper lobe mass  Patient continued to improve with IV steroids and patient will be transitioned to p o  Prednisone upon discharge  Patient also received IV Rocephin initially which was later discontinued as procalcitonin level was normal   Patient finished a 5 day course of azithromycin  Please see above list of diagnoses and related plan for additional information  Condition at Discharge: stable       Discharge Day Visit / Exam:     Subjective:  Patient is nonverbal   No acute events overnight per RN    Vitals: Blood Pressure: (!) 178/46 (01/22/19 1603)  Pulse: 74 (01/22/19 1603)  Temperature: 98 8 °F (37 1 °C) (01/22/19 1603)  Temp Source: Tympanic (01/22/19 1603)  Respirations: 18 (01/22/19 1603)  Height: 5' 4" (162 6 cm) (01/19/19 1340)  Weight - Scale: 42 1 kg (92 lb 13 oz) (01/22/19 0600)  SpO2: 90 % (01/22/19 1603)  Exam:   Physical Exam Constitutional: No distress  HENT:   Head: Normocephalic and atraumatic  Nose: Nose normal    Eyes: Pupils are equal, round, and reactive to light  Conjunctivae are normal    Neck: Normal range of motion  Neck supple  Cardiovascular: Normal rate and regular rhythm  Murmur heard  Pulmonary/Chest: Effort normal and breath sounds normal  No respiratory distress  She has no wheezes  She has no rales  Abdominal: Soft  Bowel sounds are normal  She exhibits no distension  There is no tenderness  There is no rebound and no guarding  Musculoskeletal: She exhibits no edema  Neurological: She is alert  No cranial nerve deficit  Right-sided weakness and right upper extremity contracture   Skin: Skin is warm and dry  No rash noted  Discharge instructions/Information to patient and family:   See after visit summary for information provided to patient and family  Provisions for Follow-Up Care:  See after visit summary for information related to follow-up care and any pertinent home health orders  Disposition:     [facility] Readmission: No     Discharge Statement:  I spent 35 minutes discharging the patient  This time was spent on the day of discharge  I had direct contact with the patient on the day of discharge  Greater than 50% of the total time was spent examining patient, answering all patient questions, arranging and discussing plan of care with patient as well as directly providing post-discharge instructions  Additional time then spent on discharge activities  Discharge Medications:  See after visit summary for reconciled discharge medications provided to patient and family        ** Please Note: This note has been constructed using a voice recognition system **

## 2019-01-25 ENCOUNTER — TELEPHONE (OUTPATIENT)
Dept: INPATIENT UNIT | Facility: HOSPITAL | Age: 84
End: 2019-01-25

## 2019-01-25 NOTE — TELEPHONE ENCOUNTER
Called patient for post discharge follow up  Pt at [facility]  Spoke with Kyara Platt, pt's nurse  Reports patient has been doing well, up and out of bed

## 2019-03-13 DIAGNOSIS — R52 PAIN: ICD-10-CM

## 2019-03-13 RX ORDER — HYDROCODONE BITARTRATE AND ACETAMINOPHEN 5; 325 MG/1; MG/1
TABLET ORAL
Qty: 60 TABLET | Refills: 0 | Status: SHIPPED | OUTPATIENT
Start: 2019-03-13 | End: 2019-04-10 | Stop reason: SDUPTHER

## 2019-04-10 DIAGNOSIS — R52 PAIN: ICD-10-CM

## 2019-04-10 RX ORDER — HYDROCODONE BITARTRATE AND ACETAMINOPHEN 5; 325 MG/1; MG/1
TABLET ORAL
Qty: 60 TABLET | Refills: 0 | Status: SHIPPED | OUTPATIENT
Start: 2019-04-10 | End: 2019-05-14

## 2019-05-01 ENCOUNTER — TELEPHONE (OUTPATIENT)
Dept: FAMILY MEDICINE CLINIC | Facility: CLINIC | Age: 84
End: 2019-05-01

## 2019-05-14 DIAGNOSIS — R52 PAIN: ICD-10-CM

## 2019-05-14 RX ORDER — HYDROCODONE BITARTRATE AND ACETAMINOPHEN 5; 325 MG/1; MG/1
TABLET ORAL
Qty: 60 TABLET | Refills: 0 | Status: SHIPPED | OUTPATIENT
Start: 2019-05-14 | End: 2019-06-12 | Stop reason: SDUPTHER

## 2019-06-12 DIAGNOSIS — R52 PAIN: ICD-10-CM

## 2019-06-12 RX ORDER — HYDROCODONE BITARTRATE AND ACETAMINOPHEN 5; 325 MG/1; MG/1
TABLET ORAL
Qty: 60 TABLET | Refills: 0 | Status: SHIPPED | OUTPATIENT
Start: 2019-06-12

## 2020-02-11 ENCOUNTER — APPOINTMENT (EMERGENCY)
Dept: RADIOLOGY | Facility: HOSPITAL | Age: 85
End: 2020-02-11
Payer: MEDICARE

## 2020-02-11 ENCOUNTER — HOSPITAL ENCOUNTER (EMERGENCY)
Facility: HOSPITAL | Age: 85
Discharge: NURSING, REHAB OR LONG-TERM CARE | End: 2020-02-11
Attending: EMERGENCY MEDICINE
Payer: MEDICARE

## 2020-02-11 VITALS
RESPIRATION RATE: 18 BRPM | SYSTOLIC BLOOD PRESSURE: 159 MMHG | DIASTOLIC BLOOD PRESSURE: 89 MMHG | OXYGEN SATURATION: 97 % | HEART RATE: 76 BPM | TEMPERATURE: 97.4 F

## 2020-02-11 DIAGNOSIS — S61.219A FINGER LACERATION: Primary | ICD-10-CM

## 2020-02-11 PROCEDURE — 12004 RPR S/N/AX/GEN/TRK7.6-12.5CM: CPT | Performed by: EMERGENCY MEDICINE

## 2020-02-11 PROCEDURE — 99283 EMERGENCY DEPT VISIT LOW MDM: CPT

## 2020-02-11 PROCEDURE — 73140 X-RAY EXAM OF FINGER(S): CPT

## 2020-02-11 PROCEDURE — 90471 IMMUNIZATION ADMIN: CPT

## 2020-02-11 PROCEDURE — 90715 TDAP VACCINE 7 YRS/> IM: CPT | Performed by: EMERGENCY MEDICINE

## 2020-02-11 PROCEDURE — 99284 EMERGENCY DEPT VISIT MOD MDM: CPT | Performed by: EMERGENCY MEDICINE

## 2020-02-11 RX ORDER — GINSENG 100 MG
1 CAPSULE ORAL ONCE
Status: COMPLETED | OUTPATIENT
Start: 2020-02-11 | End: 2020-02-11

## 2020-02-11 RX ORDER — CEPHALEXIN 500 MG/1
500 CAPSULE ORAL ONCE
Status: COMPLETED | OUTPATIENT
Start: 2020-02-11 | End: 2020-02-11

## 2020-02-11 RX ORDER — CEPHALEXIN 500 MG/1
500 CAPSULE ORAL EVERY 12 HOURS SCHEDULED
Qty: 14 CAPSULE | Refills: 0 | Status: SHIPPED | OUTPATIENT
Start: 2020-02-11 | End: 2020-02-18

## 2020-02-11 RX ORDER — LIDOCAINE HYDROCHLORIDE 10 MG/ML
10 INJECTION, SOLUTION EPIDURAL; INFILTRATION; INTRACAUDAL; PERINEURAL ONCE
Status: COMPLETED | OUTPATIENT
Start: 2020-02-11 | End: 2020-02-11

## 2020-02-11 RX ORDER — GINSENG 100 MG
1 CAPSULE ORAL 2 TIMES DAILY
Qty: 28 G | Refills: 0 | Status: SHIPPED | OUTPATIENT
Start: 2020-02-11

## 2020-02-11 RX ADMIN — TETANUS TOXOID, REDUCED DIPHTHERIA TOXOID AND ACELLULAR PERTUSSIS VACCINE, ADSORBED 0.5 ML: 5; 2.5; 8; 8; 2.5 SUSPENSION INTRAMUSCULAR at 06:57

## 2020-02-11 RX ADMIN — CEPHALEXIN 500 MG: 500 CAPSULE ORAL at 09:13

## 2020-02-11 RX ADMIN — LIDOCAINE HYDROCHLORIDE 10 ML: 10 INJECTION, SOLUTION EPIDURAL; INFILTRATION; INTRACAUDAL; PERINEURAL at 06:32

## 2020-02-11 RX ADMIN — BACITRACIN 1 SMALL APPLICATION: 500 OINTMENT TOPICAL at 06:33

## 2020-02-11 NOTE — ED NOTES
Sutures placed by Md and dressed with sterile dressing and marcel  Patient tolerated wound procedure,  No pain or discomfort noted  Incontinent of moderate amount of urine and soft, formed stool, cleaned and repositioned for same  Patient for discharge back to NH, transport arrangements in progress  Attempted to obtain vital signs and patient became restless and agitated  Care and safety provided        Kelly Alonso RN  02/11/20 [address], RN  02/11/20 [id number]

## 2020-02-11 NOTE — ED NOTES
Report called to [facility] nursing supervisor at [facility],  time for transportation back is scheduled for 1015 this AM  Daughter called earlier and was updated by ER staff re  Patient status and updates          Estevan Max RN  02/11/20 [address] RN  02/11/20 0128

## 2020-02-11 NOTE — DISCHARGE INSTRUCTIONS
Return to the ER for further concerns or worsening symptoms  Follow up with your primary care physician in 1-2 days  Take medication as prescribed  Wound dressing daily with bacitracin or neosporin  Keep finger in splint after dressing change until sutures are removed  Call Dr Anthony Lobo today for immediate follow up

## 2020-02-11 NOTE — ED NOTES
Keflex administered sprinkled in applesauce, patient is known dysphagia diet from nursing home  No cough noted with same  Encouragement needed for patient to take small bites        Murray Ackerman RN  02/11/20 [id number]

## 2020-02-11 NOTE — ED PROVIDER NOTES
History  Chief Complaint   Patient presents with    Laceration     story is unclear  Pt from nursing home arrives with EMS after stating at 3am pt was found with call bell around her left index finger and they state she sustained abrasions and lacerations  pt presents with a large avulsion      Pt in the ER from her nursing home with an injury to her left 2nd finger  Pt has a hx of dementia and is a poor historian  She was allegedly found in the bathroom with the call bell cord wrapped around her finger  Pt is unsure of etiology of injury      History provided by:  Patient  History limited by:  Dementia   used: No    Laceration   Location:  Finger  Finger laceration location:  L index finger  Depth: Through underlying tissue  Quality: avulsion and jagged    Bleeding: controlled    Laceration mechanism:  Unable to specify  Pain details:     Quality:  Unable to specify    Severity:  Unable to specify    Timing:  Unable to specify  Foreign body present:  No foreign bodies  Tetanus status:  Unknown      Prior to Admission Medications   Prescriptions Last Dose Informant Patient Reported? Taking?    HYDROcodone-acetaminophen (NORCO) 5-325 mg per tablet   No No   Si tab PO 2 times a day for pain   PARoxetine (PAXIL) 20 mg tablet   Yes No   Sig: Take 1 tablet by mouth daily   acetaminophen (TYLENOL) 325 mg tablet   No No   Sig: Take 2 tablets by mouth every 4 (four) hours as needed for mild pain, headaches or fever   Patient taking differently: Take 325 mg by mouth daily At 1400    acetaminophen (TYLENOL) 325 mg tablet   Yes No   Sig: Take 325 mg by mouth daily At 2 pm   albuterol (PROAIR HFA) 90 mcg/act inhaler   Yes No   Sig: Inhale 1-2 puffs every 6 (six) hours as needed   albuterol (PROVENTIL HFA,VENTOLIN HFA) 90 mcg/act inhaler   Yes No   Sig: Inhale 2 puffs every 6 (six) hours as needed for wheezing   allopurinol (ZYLOPRIM) 100 mg tablet   Yes No   Sig: Take 1 tablet by mouth daily artificial tear (LUBRIFRESH P M ) 83-15 % ophthalmic ointment   No No   Sig: Administer 1 application to both eyes daily at bedtime for 30 days   Patient not taking: Reported on 1/19/2019    aspirin 81 MG tablet   Yes No   Sig: Take 81 mg by mouth daily   atenolol (TENORMIN) 100 mg tablet   Yes No   Sig: Take 1 tablet by mouth daily   bisacodyl (DULCOLAX) 10 mg suppository   No No   Sig: Insert 1 suppository into the rectum daily as needed for constipation for up to 30 days   diclofenac sodium (VOLTAREN) 1 %   Yes No   Sig: Place on the skin daily   diltiazem (CARDIZEM SR) 60 mg 12 hr capsule   No No   Sig: Take 1 capsule by mouth every 12 (twelve) hours for 30 days   docusate sodium (COLACE) 100 mg capsule   Yes No   Sig: Take 200 mg by mouth daily   donepezil (ARICEPT) 10 mg tablet   Yes No   Sig: Take 10 mg by mouth daily at bedtime   gabapentin (NEURONTIN) 100 mg capsule   Yes No   Sig: Take 200 mg by mouth 3 (three) times a day   ipratropium-albuterol (DUO-NEB) 0 5-2 5 mg/3 mL nebulizer solution   No No   Sig: Take 1 vial (3 mL total) by nebulization every 6 (six) hours   ipratropium-albuterol (DUO-NEB) 0 5-2 5 mg/3 mL nebulizer solution   No No   Sig: Take 1 vial (3 mL total) by nebulization every 4 (four) hours as needed for wheezing   metoprolol tartrate 75 MG TABS   No No   Sig: Take 1 tablet by mouth every 12 (twelve) hours for 30 days   Patient taking differently: Take 75 mg by mouth 2 (two) times a day With meals    nitrofurantoin (MACROBID) 100 mg capsule   Yes No   Sig: Take by mouth   oxyCODONE (ROXICODONE) 5 mg immediate release tablet   Yes No   Sig: Take 1 tablet by mouth every 4 (four) hours as needed   predniSONE 10 mg tablet   No No   Sig: Take 1 tablet (10 mg total) by mouth daily   rosuvastatin (CRESTOR) 10 MG tablet   Yes No   Sig: Take 10 mg by mouth daily   traZODone (DESYREL) 50 mg tablet   Yes No   Sig: Take 1 tablet by mouth   umeclidinium-vilanterol (ANORO ELLIPTA) 62 5-25 MCG/INH inhaler   Yes No   Sig: Inhale 1 puff daily      Facility-Administered Medications: None       Past Medical History:   Diagnosis Date    Arthritis     [facility]     Dementia [facility]     [facility]     Gout     Gout     Hyperlipidemia     Hypertension        Past Surgical History:   Procedure Laterality Date    CATARACT EXTRACTION W/  INTRAOCULAR LENS IMPLANT Bilateral     CHOLECYSTECTOMY      ELBOW FRACTURE SURGERY      HYSTERECTOMY      JOINT REPLACEMENT Bilateral     Had bilateral hip surgery  One was repaired secondary to fracture other was replaced due to DJD       Family History   Problem Relation Age of Onset    Heart attack Father 45     I have reviewed and agree with the history as documented  Social History     Tobacco Use    Smoking status: Former Smoker     Packs/day:      Years: 25      Pack years: 25      Types: Cigarettes     Last attempt to quit:      Years since quittin 1    Smokeless tobacco: Never Used    Tobacco comment: Patient former smoker, quit    Substance Use Topics    Alcohol use: No    Drug use: No        Review of Systems   Unable to perform ROS: Dementia   Skin: Positive for wound  Physical Exam  Physical Exam   Constitutional: She appears well-developed and well-nourished  HENT:   Head: Normocephalic and atraumatic  Eyes: Pupils are equal, round, and reactive to light  EOM are normal    Musculoskeletal: She exhibits tenderness  She exhibits no deformity  Left hand: She exhibits decreased range of motion, tenderness, bony tenderness and laceration  She exhibits normal two-point discrimination, normal capillary refill, no deformity and no swelling  Normal sensation noted  Normal strength noted  Hands:  Neurological: She is alert  Nursing note and vitals reviewed        Vital Signs  ED Triage Vitals   Temperature Pulse Respirations Blood Pressure SpO2   20 0547 20 0547 02/11/20 0548 02/11/20 0547 02/11/20 0547   97 7 °F (36 5 °C) 74 16 168/97 98 %      Temp Source Heart Rate Source Patient Position - Orthostatic VS BP Location FiO2 (%)   02/11/20 0547 02/11/20 0914 02/11/20 0547 02/11/20 0547 --   Tympanic Monitor Sitting Left arm       Pain Score       02/11/20 0914       No Pain           Vitals:    02/11/20 0547 02/11/20 0914   BP: 168/97 159/89   Pulse: 74 76   Patient Position - Orthostatic VS: Sitting Lying         Visual Acuity      ED Medications  Medications   lidocaine (PF) (XYLOCAINE-MPF) 1 % injection 10 mL (10 mL Infiltration Given 2/11/20 4298)   bacitracin topical ointment 1 small application (1 small application Topical Given 2/11/20 5883)   tetanus-diphtheria-acellular pertussis (BOOSTRIX) IM injection 0 5 mL (0 5 mL Intramuscular Given 2/11/20 0657)   cephalexin (KEFLEX) capsule 500 mg (500 mg Oral Given 2/11/20 0913)       Diagnostic Studies  Results Reviewed     None                 XR finger second digit-index LEFT   Final Result by Escobar Fermin MD (02/11 5706)      Soft tissue injury of the index finger without evidence of fracture            Workstation performed: [id number]                    Procedures  Laceration repair  Date/Time: 2/11/2020 8:30 AM  Performed by: Madhuri Hilton DO  Authorized by: Madhuri Hilton DO   Consent: Verbal consent obtained  Risks and benefits: risks, benefits and alternatives were discussed  Consent given by: patient  Patient understanding: patient states understanding of the procedure being performed  Site marked: the operative site was marked  Radiology Images displayed and confirmed  If images not available, report reviewed: imaging studies available  Patient identity confirmed: verbally with patient  Time out: Immediately prior to procedure a "time out" was called to verify the correct patient, procedure, equipment, support staff and site/side marked as required    Body area: upper extremity  Location details: left index finger  Laceration length: 8 cm  Foreign bodies: no foreign bodies  Tendon involvement: none  Nerve involvement: none  Anesthesia: digital block    Anesthesia:  Local Anesthetic: lidocaine 1% without epinephrine  Anesthetic total: 10 mL    Sedation:  Patient sedated: no      Wound Dehiscence:    Secondary closure or dehiscence: complex    Procedure Details:  Preparation: Patient was prepped and draped in the usual sterile fashion  Irrigation solution: tap water  Irrigation method: tap  Amount of cleaning: extensive  Debridement: none  Degree of undermining: none  Skin closure: 4-0 nylon  Number of sutures: 12  Technique: complex  Approximation: close  Approximation difficulty: complex  Dressing: 4x4 sterile gauze, antibiotic ointment, gauze packing, splint and gauze roll  Patient tolerance: Patient tolerated the procedure well with no immediate complications  Comments: 11 on the dorsum, 1 on the ventral surface               ED Course                               MDM      Disposition  Final diagnoses:   Finger laceration     Time reflects when diagnosis was documented in both MDM as applicable and the Disposition within this note     Time User Action Codes Description Comment    2/11/2020  8:39 AM Alton Rivera Add [[id number]] Finger laceration       ED Disposition     ED Disposition Condition Date/Time Comment    Discharge Stable e Feb 11, 2020  8:38 AM Paulie Jules discharge to home/self care            Follow-up Information     Follow up With Specialties Details Why Contact Amado Diane DO Orthopedic Surgery, Hand Surgery In 1 day for follow up, For wound re-check [address], [address]  [phone]      your primary care physician or the ER  In 10 days For suture removal           Discharge Medication List as of 2/11/2020  8:53 AM      START taking these medications    Details   bacitracin topical ointment 500 units/g topical ointment Apply 1 large application topically 2 (two) times a day, Starting Tue 2/11/2020, Print      cephalexin (KEFLEX) 500 mg capsule Take 1 capsule (500 mg total) by mouth every 12 (twelve) hours for 7 days, Starting Tue 2/11/2020, Until Tue 2/18/2020, Print         CONTINUE these medications which have NOT CHANGED    Details   !! acetaminophen (TYLENOL) 325 mg tablet Take 2 tablets by mouth every 4 (four) hours as needed for mild pain, headaches or fever, Starting 1/12/2017, Until Discontinued, Print      !! acetaminophen (TYLENOL) 325 mg tablet Take 325 mg by mouth daily At 2 pm, Historical Med      !! albuterol (PROAIR HFA) 90 mcg/act inhaler Inhale 1-2 puffs every 6 (six) hours as needed, Starting Wed 4/20/2016, Historical Med      !! albuterol (PROVENTIL HFA,VENTOLIN HFA) 90 mcg/act inhaler Inhale 2 puffs every 6 (six) hours as needed for wheezing, Until Discontinued, Historical Med      allopurinol (ZYLOPRIM) 100 mg tablet Take 1 tablet by mouth daily, Starting Wed 6/22/2016, Historical Med      artificial tear (LUBRIFRESH P M ) 83-15 % ophthalmic ointment Administer 1 application to both eyes daily at bedtime for 30 days, Starting Thu 1/12/2017, Until Sat 1/19/2019, Print      aspirin 81 MG tablet Take 81 mg by mouth daily, Until Discontinued, Historical Med      atenolol (TENORMIN) 100 mg tablet Take 1 tablet by mouth daily, Starting Sun 5/8/2016, Historical Med      bisacodyl (DULCOLAX) 10 mg suppository Insert 1 suppository into the rectum daily as needed for constipation for up to 30 days, Starting 1/12/2017, Until Fri 5/5/17, Print      diclofenac sodium (VOLTAREN) 1 % Place on the skin daily, Historical Med      diltiazem (CARDIZEM SR) 60 mg 12 hr capsule Take 1 capsule by mouth every 12 (twelve) hours for 30 days, Starting Thu 1/12/2017, Until Sat 1/19/2019, Print      docusate sodium (COLACE) 100 mg capsule Take 200 mg by mouth daily, Until Discontinued, Historical Med      donepezil (ARICEPT) 10 mg tablet Take 10 mg by mouth daily at bedtime, Until Discontinued, Historical Med      gabapentin (NEURONTIN) 100 mg capsule Take 200 mg by mouth 3 (three) times a day, Historical Med      HYDROcodone-acetaminophen (NORCO) 5-325 mg per tablet 1 tab PO 2 times a day for pain, Normal      !! ipratropium-albuterol (DUO-NEB) 0 5-2 5 mg/3 mL nebulizer solution Take 1 vial (3 mL total) by nebulization every 6 (six) hours, Starting Tue 1/22/2019, No Print      !! ipratropium-albuterol (DUO-NEB) 0 5-2 5 mg/3 mL nebulizer solution Take 1 vial (3 mL total) by nebulization every 4 (four) hours as needed for wheezing, Starting Tue 1/22/2019, No Print      metoprolol tartrate 75 MG TABS Take 1 tablet by mouth every 12 (twelve) hours for 30 days, Starting Thu 1/12/2017, Until Sat 1/19/2019, Print      nitrofurantoin (MACROBID) 100 mg capsule Take by mouth, Starting Mon 1/16/2017, Historical Med      oxyCODONE (ROXICODONE) 5 mg immediate release tablet Take 1 tablet by mouth every 4 (four) hours as needed, Starting Thu 1/12/2017, Historical Med      PARoxetine (PAXIL) 20 mg tablet Take 1 tablet by mouth daily, Starting Fri 5/29/2015, Historical Med      predniSONE 10 mg tablet Take 1 tablet (10 mg total) by mouth daily, Starting Tue 1/22/2019, No Print      rosuvastatin (CRESTOR) 10 MG tablet Take 10 mg by mouth daily, Until Discontinued, Historical Med      traZODone (DESYREL) 50 mg tablet Take 1 tablet by mouth, Starting Wed 7/20/2016, Historical Med      umeclidinium-vilanterol (ANORO ELLIPTA) 62 5-25 MCG/INH inhaler Inhale 1 puff daily, Starting Wed 1/13/2016, Historical Med       !! - Potential duplicate medications found  Please discuss with provider  No discharge procedures on file      ED Provider  Electronically Signed by           Chitra Smith,   02/18/20 6777